# Patient Record
Sex: MALE | Race: WHITE | NOT HISPANIC OR LATINO | Employment: FULL TIME | URBAN - METROPOLITAN AREA
[De-identification: names, ages, dates, MRNs, and addresses within clinical notes are randomized per-mention and may not be internally consistent; named-entity substitution may affect disease eponyms.]

---

## 2017-05-19 ENCOUNTER — OFFICE VISIT (OUTPATIENT)
Dept: MEDICAL GROUP | Facility: PHYSICIAN GROUP | Age: 37
End: 2017-05-19
Payer: COMMERCIAL

## 2017-05-19 VITALS
TEMPERATURE: 97.3 F | OXYGEN SATURATION: 94 % | HEIGHT: 70 IN | SYSTOLIC BLOOD PRESSURE: 118 MMHG | DIASTOLIC BLOOD PRESSURE: 82 MMHG | WEIGHT: 201 LBS | HEART RATE: 73 BPM | BODY MASS INDEX: 28.77 KG/M2

## 2017-05-19 DIAGNOSIS — R20.2 NUMBNESS AND TINGLING OF RIGHT UPPER AND LOWER EXTREMITY: ICD-10-CM

## 2017-05-19 DIAGNOSIS — J40 BRONCHITIS: ICD-10-CM

## 2017-05-19 DIAGNOSIS — R05.9 COUGH: ICD-10-CM

## 2017-05-19 DIAGNOSIS — R20.0 NUMBNESS AND TINGLING OF RIGHT UPPER AND LOWER EXTREMITY: ICD-10-CM

## 2017-05-19 PROCEDURE — 99214 OFFICE O/P EST MOD 30 MIN: CPT | Performed by: FAMILY MEDICINE

## 2017-05-19 RX ORDER — AZITHROMYCIN 250 MG/1
TABLET, FILM COATED ORAL
Qty: 6 TAB | Refills: 0 | Status: SHIPPED | OUTPATIENT
Start: 2017-05-19 | End: 2019-09-26

## 2017-05-19 NOTE — ASSESSMENT & PLAN NOTE
New problem: present for about 2 weeks; dry cough, runny nose; sinus congestion; denies any fever/chills but does have headache.    Took PCN from family; otherwise has taken over-the-counter Mucinex which was only mildly beneficial. He denies any fevers or chills.

## 2017-05-19 NOTE — MR AVS SNAPSHOT
"        Dominick Cuadra   2017 4:40 PM   Office Visit   MRN: [id number]    Department:  [facility]   Dept Phone:  [phone]    Description:  Male : 1980   Provider:  Elsie Ybarra D.O.           Reason for Visit     Cough x5days. numbness      Allergies as of 2017     No Known Allergies      You were diagnosed with     Bronchitis   [243780]       Cough   [786.2.ICD-9-CM]       Numbness and tingling of right upper and lower extremity   [0599990]         Vital Signs     Blood Pressure Pulse Temperature Height Weight Body Mass Index    118/82 mmHg 73 36.3 °C (97.3 °F) 1.778 m (5' 10\") 91.173 kg (201 lb) 28.84 kg/m2    Oxygen Saturation Smoking Status                94% Current Every Day Smoker          Basic Information     Date Of Birth Sex Race Ethnicity Preferred Language    1980 Male White Non- English      Problem List              ICD-10-CM Priority Class Noted - Resolved    Alcohol dependence in remission (CMS-HCC) F10.21   11/10/2016 - Present    Anxiety F41.9   11/10/2016 - Present    Cough R05   2017 - Present    Numbness and tingling of right upper and lower extremity R20.0, R20.2   2017 - Present      Health Maintenance        Date Due Completion Dates    IMM DTaP/Tdap/Td Vaccine (2 - Td) 11/10/2026 11/10/2016            Current Immunizations     Tdap Vaccine 11/10/2016      Below and/or attached are the medications your provider expects you to take. Review all of your home medications and newly ordered medications with your provider and/or pharmacist. Follow medication instructions as directed by your provider and/or pharmacist. Please keep your medication list with you and share with your provider. Update the information when medications are discontinued, doses are changed, or new medications (including over-the-counter products) are added; and carry medication information at all times in the event of emergency situations     Allergies:  No Known Allergies   "          Medications  Valid as of: May 19, 2017 -  5:02 PM    Generic Name Brand Name Tablet Size Instructions for use    Azithromycin (Tab) ZITHROMAX 250 MG Take 2 pills on day 1; then one pill day for day 2-5        Escitalopram Oxalate (Tab) LEXAPRO 20 MG TAKE 1 TAB BY MOUTH EVERY DAY.        TraZODone HCl (Tab) DESYREL 100 MG Take 1 Tab by mouth every bedtime.        .                 Medicines prescribed today were sent to:     [facility] #[id number] - [facility] [place] - [address]    [address] [address]    Phone: [phone] Fax: [phone]    Open 24 Hours?: No      Medication refill instructions:       If your prescription bottle indicates you have medication refills left, it is not necessary to call your provider’s office. Please contact your pharmacy and they will refill your medication.    If your prescription bottle indicates you do not have any refills left, you may request refills at any time through one of the following ways: The online [employer] system (except Urgent Care), by calling your provider’s office, or by asking your pharmacy to contact your provider’s office with a refill request. Medication refills are processed only during regular business hours and may not be available until the next business day. Your provider may request additional information or to have a follow-up visit with you prior to refilling your medication.   *Please Note: Medication refills are assigned a new Rx number when refilled electronically. Your pharmacy may indicate that no refills were authorized even though a new prescription for the same medication is available at the pharmacy. Please request the medicine by name with the pharmacy before contacting your provider for a refill.        Your To Do List     Future Labs/Procedures Complete By Expires    CT-HEAD WITH & W/O  As directed 5/19/2018         [employer] Status: Patient Declined        Quit Tobacco Information     Do you want to quit  using tobacco?    Quitting tobacco decreases risks of cancer, heart and lung disease, increases life expectancy, improves sense of taste and smell, and increases spending money, among other benefits.    If you are thinking about quitting, we can help.  • [facility] Quit Tobacco Program: 110.304.4059  o Program occurs weekly for four weeks and includes pharmacist consultation on products to support quitting smoking or chewing tobacco. A provider referral is needed for pharmacist consultation.  • Tobacco Users Help Hotline: 7-891-QUIT-NOW (140-0239) or https://[place].quitlogix.org/  o Free, confidential telephone and online coaching for [place] residents. Sessions are designed on a schedule that is convenient for you. Eligible clients receive free nicotine replacement therapy.  • Nationally: www.smokefree.gov  o Information and professional assistance to support both immediate and long-term needs as you become, and remain, a non-smoker. Smokefree.gov allows you to choose the help that best fits your needs.

## 2017-05-20 NOTE — ASSESSMENT & PLAN NOTE
New problem. Patient is reporting that he is having numbness and tingling in both his right upper and lower extremity. Patient states this started approximately 3 days ago. He denies any weakness associated with this; he denies any other symptoms such as swelling, change in temperature sensation, change in vibratory sensation. He states that the numbness and tingling have been present consistently during this time; he also denies any other symptoms such as headache, dizziness, chest pain, changes in vision. Upon questioning, patient does confirm that the numbness is in his hand, upper arm, shoulder, foot all on the right side.    Given the severity of this type of symptoms by recommendation is for patient to go to the ER but he refused.

## 2017-05-20 NOTE — PROGRESS NOTES
"Subjective:   Dominick Cuadra is a 36 y.o. male here today for dry cough and right side numbness    Cough  New problem: present for about 2 weeks; dry cough, runny nose; sinus congestion; denies any fever/chills but does have headache.    Took PCN from family; otherwise has taken over-the-counter Mucinex which was only mildly beneficial. He denies any fevers or chills.    Numbness and tingling of right upper and lower extremity  New problem. Patient is reporting that he is having numbness and tingling in both his right upper and lower extremity. Patient states this started approximately 3 days ago. He denies any weakness associated with this; he denies any other symptoms such as swelling, change in temperature sensation, change in vibratory sensation. He states that the numbness and tingling have been present consistently during this time; he also denies any other symptoms such as headache, dizziness, chest pain, changes in vision. Upon questioning, patient does confirm that the numbness is in his hand, upper arm, shoulder, foot all on the right side.    Given the severity of this type of symptoms by recommendation is for patient to go to the ER but he refused.         Current medicines (including changes today)  Current Outpatient Prescriptions   Medication Sig Dispense Refill   • azithromycin (ZITHROMAX) 250 MG Tab Take 2 pills on day 1; then one pill day for day 2-5 6 Tab 0   • escitalopram (LEXAPRO) 20 MG tablet TAKE 1 TAB BY MOUTH EVERY DAY. 90 Tab 0   • trazodone (DESYREL) 100 MG Tab Take 1 Tab by mouth every bedtime. 30 Tab 3     No current facility-administered medications for this visit.     He  has a past medical history of Alcohol abuse.    ROS   No chest pain, no shortness of breath, no abdominal pain  +dry cough; right side numbness     Objective:     Blood pressure 118/82, pulse 73, temperature 36.3 °C (97.3 °F), height 1.778 m (5' 10\"), weight 91.173 kg (201 lb), SpO2 94 %. Body mass index is 28.84 " kg/(m^2).   Physical Exam:  Alert, oriented in no acute distress.  Eye contact is good, speech goal directed, affect calm  HEENT: conjunctiva non-injected, sclera non-icteric.  Pinna normal. TM pearly gray.   Oral mucous membranes pink and moist with no lesions.  Neck No adenopathy or masses in the neck or supraclavicular regions.  Lungs: mild expiratory wheeze noted to all lung fields  CV: regular rate and rhythm.  Abdomen: soft, nontender, No CVAT  Ext: no edema, color normal, vascularity normal, temperature normal  Neuro: CN 2-12 grossly intact; no noted decrease in sensation of right side  MSK: no decrease in mobility of right side; normal gait      Assessment and Plan:   The following treatment plan was discussed     1. Bronchitis      Uncontrolled. Prescription for azithromycin provided; monitor   2. Cough      Uncontrolled; symptoms secondary to problem #1.   3. Numbness and tingling of right upper and lower extremity  CT-HEAD WITH & W/O    Unknown origin. Despite my urging, patient refused to go to the ER. Order head CT for further evaluation. Monitor. Pt kept asking if anything could be given for the symptoms or if any thing else could be done today.  Again, I have stated directly to the pt that these are concerning symptoms since it covers the entire right side of the body and he should go to the ER for evaluation.  Again he refused.        Followup: Return if symptoms worsen or fail to improve.

## 2017-05-22 ENCOUNTER — HOSPITAL ENCOUNTER (OUTPATIENT)
Dept: RADIOLOGY | Facility: MEDICAL CENTER | Age: 37
End: 2017-05-22
Attending: FAMILY MEDICINE
Payer: COMMERCIAL

## 2017-05-22 DIAGNOSIS — R20.0 NUMBNESS AND TINGLING OF RIGHT UPPER AND LOWER EXTREMITY: ICD-10-CM

## 2017-05-22 DIAGNOSIS — R20.2 NUMBNESS AND TINGLING OF RIGHT UPPER AND LOWER EXTREMITY: ICD-10-CM

## 2017-05-22 PROCEDURE — 70450 CT HEAD/BRAIN W/O DYE: CPT

## 2017-05-23 ENCOUNTER — TELEPHONE (OUTPATIENT)
Dept: MEDICAL GROUP | Facility: PHYSICIAN GROUP | Age: 37
End: 2017-05-23

## 2017-05-23 DIAGNOSIS — R20.0 NUMBNESS AND TINGLING: ICD-10-CM

## 2017-05-23 DIAGNOSIS — R20.2 NUMBNESS AND TINGLING: ICD-10-CM

## 2017-05-23 NOTE — TELEPHONE ENCOUNTER
----- Message from Elsie Ybarra D.O. sent at 5/23/2017  8:34 AM PDT -----  Please advise pt that the CT of the head did not show any reason for the numbness in the hand and foot on the right side.  If this continues, please let me know.  I will need to refer you to a specialist for further evaluation.  The CT did show sinus congestion which could indicate allergies.  Recommend over the counter allegra 180 mg daily.    Elsie Ybarra D.O.

## 2017-06-15 RX ORDER — ESCITALOPRAM OXALATE 20 MG/1
TABLET ORAL
Qty: 90 TAB | Refills: 0 | Status: SHIPPED | OUTPATIENT
Start: 2017-06-15 | End: 2017-09-22 | Stop reason: SDUPTHER

## 2017-07-24 RX ORDER — TRAZODONE HYDROCHLORIDE 100 MG/1
100 TABLET ORAL
Qty: 90 TAB | Refills: 0 | Status: SHIPPED | OUTPATIENT
Start: 2017-07-24 | End: 2019-09-26

## 2017-09-22 RX ORDER — ESCITALOPRAM OXALATE 20 MG/1
TABLET ORAL
Qty: 90 TAB | Refills: 1 | Status: SHIPPED | OUTPATIENT
Start: 2017-09-22 | End: 2018-04-01 | Stop reason: SDUPTHER

## 2017-10-26 ENCOUNTER — NON-PROVIDER VISIT (OUTPATIENT)
Dept: URGENT CARE | Facility: CLINIC | Age: 37
End: 2017-10-26

## 2017-10-26 DIAGNOSIS — Z02.1 PRE-EMPLOYMENT DRUG SCREENING: ICD-10-CM

## 2017-10-26 LAB
AMP AMPHETAMINE: NORMAL
COC COCAINE: NORMAL
INT CON NEG: NORMAL
INT CON POS: NORMAL
MET METHAMPHETAMINES: NORMAL
OPI OPIATES: NORMAL
PCP PHENCYCLIDINE: NORMAL
POC DRUG COMMENT 753798-OCCUPATIONAL HEALTH: NEGATIVE
THC: NORMAL

## 2017-10-26 PROCEDURE — 80305 DRUG TEST PRSMV DIR OPT OBS: CPT | Performed by: PHYSICIAN ASSISTANT

## 2019-01-14 RX ORDER — ESCITALOPRAM OXALATE 20 MG/1
TABLET ORAL
Qty: 90 TAB | Refills: 0 | Status: SHIPPED | OUTPATIENT
Start: 2019-01-14 | End: 2019-04-22 | Stop reason: SDUPTHER

## 2019-01-14 NOTE — TELEPHONE ENCOUNTER
Requested Prescriptions     Pending Prescriptions Disp Refills   • escitalopram (LEXAPRO) 20 MG tablet [Pharmacy Med Name: ESCITALOPRAM 20 MG TABLET] 90 Tab 0     Sig: TAKE 1 TABLET BY MOUTH EVERY DAY       VELVET Mitchell.

## 2019-01-14 NOTE — TELEPHONE ENCOUNTER
Was the patient seen in the last year in this department? No  LOV 5/19/17    Does patient have an active prescription for medications requested? Yes    Received Request Via: Pharmacy

## 2019-04-22 RX ORDER — ESCITALOPRAM OXALATE 20 MG/1
20 TABLET ORAL
Qty: 90 TAB | Refills: 0 | Status: SHIPPED | OUTPATIENT
Start: 2019-04-22 | End: 2019-05-28 | Stop reason: SDUPTHER

## 2019-04-22 NOTE — TELEPHONE ENCOUNTER
Was the patient seen in the last year in this department? Yes    Does patient have an active prescription for medications requested? No     Received Request Via: Patient       Patient is establishing with Ness Duncan on 6/4/19.

## 2019-04-22 NOTE — TELEPHONE ENCOUNTER
Requested Prescriptions     Pending Prescriptions Disp Refills   • escitalopram (LEXAPRO) 20 MG tablet 90 Tab 0     Sig: Take 1 Tab by mouth every day. Must keep establishing appointment on 6/4/2019 for further refills       VELVET Mitchell.

## 2019-05-28 RX ORDER — ESCITALOPRAM OXALATE 20 MG/1
20 TABLET ORAL
Qty: 90 TAB | Refills: 0 | Status: SHIPPED | OUTPATIENT
Start: 2019-05-28 | End: 2019-06-19 | Stop reason: SDUPTHER

## 2019-05-28 NOTE — TELEPHONE ENCOUNTER
Was the patient seen in the last year in this department? Yes pt is coming to est 6/6/19    Does patient have an active prescription for medications requested? No     Received Request Via: Patient

## 2019-06-19 RX ORDER — ESCITALOPRAM OXALATE 20 MG/1
20 TABLET ORAL DAILY
Qty: 90 TAB | Refills: 0 | Status: SHIPPED | OUTPATIENT
Start: 2019-06-19 | End: 2019-06-28 | Stop reason: SDUPTHER

## 2019-06-19 NOTE — TELEPHONE ENCOUNTER
Requested Prescriptions     Pending Prescriptions Disp Refills   • escitalopram (LEXAPRO) 20 MG tablet [Pharmacy Med Name: ESCITALOPRAM 20 MG TABLET] 90 Tab 0     Sig: Take 1 Tab by mouth every day. LAST REFILL Patient needs to keep upcoming appointment with a new PCP for future refills.06/19/19       VELVET Mitchell.

## 2019-06-19 NOTE — TELEPHONE ENCOUNTER
Was the patient seen in the last year in this department? No  LOV 05/19/17 Pt schedule to establish with Ness on 09/26/19    Does patient have an active prescription for medications requested? No     Received Request Via: Pharmacy

## 2019-06-28 DIAGNOSIS — F41.9 ANXIETY: ICD-10-CM

## 2019-06-28 NOTE — TELEPHONE ENCOUNTER
Was the patient seen in the last year in this department? No  LAST OFFICE VISIT 05/19/17 SCHEDULE TO ESTABLISH WITH JACKI MCCLENDON 09/26/19    Does patient have an active prescription for medications requested? No     Received Request Via: Pharmacy

## 2019-06-29 RX ORDER — ESCITALOPRAM OXALATE 20 MG/1
20 TABLET ORAL DAILY
Qty: 90 TAB | Refills: 0 | Status: SHIPPED | OUTPATIENT
Start: 2019-06-29 | End: 2019-09-26 | Stop reason: SDUPTHER

## 2019-06-29 NOTE — TELEPHONE ENCOUNTER
Requested Prescriptions     Pending Prescriptions Disp Refills   • escitalopram (LEXAPRO) 20 MG tablet [Pharmacy Med Name: ESCITALOPRAM 20 MG TABLET] 90 Tab 0     Sig: Take 1 Tab by mouth every day. Patient needs to keep upcoming appointment with a new PCP for future refills. 06/28/19       VELVET Mitchell.

## 2019-09-26 ENCOUNTER — OFFICE VISIT (OUTPATIENT)
Dept: MEDICAL GROUP | Facility: PHYSICIAN GROUP | Age: 39
End: 2019-09-26
Payer: COMMERCIAL

## 2019-09-26 VITALS
HEART RATE: 80 BPM | TEMPERATURE: 98.8 F | HEIGHT: 70 IN | BODY MASS INDEX: 28.77 KG/M2 | SYSTOLIC BLOOD PRESSURE: 100 MMHG | DIASTOLIC BLOOD PRESSURE: 70 MMHG | OXYGEN SATURATION: 96 % | WEIGHT: 201 LBS

## 2019-09-26 DIAGNOSIS — Z13.220 SCREENING FOR LIPID DISORDERS: ICD-10-CM

## 2019-09-26 DIAGNOSIS — F10.21 ALCOHOL DEPENDENCE IN REMISSION (HCC): ICD-10-CM

## 2019-09-26 DIAGNOSIS — R53.82 CHRONIC FATIGUE: ICD-10-CM

## 2019-09-26 DIAGNOSIS — Z00.00 ROUTINE HEALTH MAINTENANCE: ICD-10-CM

## 2019-09-26 DIAGNOSIS — F41.9 ANXIETY: ICD-10-CM

## 2019-09-26 PROBLEM — R05.9 COUGH: Status: RESOLVED | Noted: 2017-05-19 | Resolved: 2019-09-26

## 2019-09-26 PROBLEM — R20.0 NUMBNESS AND TINGLING OF RIGHT UPPER AND LOWER EXTREMITY: Status: RESOLVED | Noted: 2017-05-19 | Resolved: 2019-09-26

## 2019-09-26 PROBLEM — R20.2 NUMBNESS AND TINGLING OF RIGHT UPPER AND LOWER EXTREMITY: Status: RESOLVED | Noted: 2017-05-19 | Resolved: 2019-09-26

## 2019-09-26 PROCEDURE — 99214 OFFICE O/P EST MOD 30 MIN: CPT | Performed by: NURSE PRACTITIONER

## 2019-09-26 RX ORDER — ESCITALOPRAM OXALATE 20 MG/1
20 TABLET ORAL DAILY
Qty: 90 TAB | Refills: 3 | Status: SHIPPED | OUTPATIENT
Start: 2019-09-26 | End: 2020-09-30

## 2019-09-26 ASSESSMENT — PATIENT HEALTH QUESTIONNAIRE - PHQ9: CLINICAL INTERPRETATION OF PHQ2 SCORE: 0

## 2019-09-27 NOTE — ASSESSMENT & PLAN NOTE
This is a new problem to the examiner.  Patient reports that he has not drink alcohol in almost 3 years, since October 2016.

## 2019-09-27 NOTE — ASSESSMENT & PLAN NOTE
This is a new problem to the examiner.  This is a chronic problem for the patient that began around October 2016 when he quit drinking alcohol.  The patient had reported feelings of overwhelming anxiety that would come and go.  The patient has continued to take Lexapro 20 mg/day for this issue and reports that it works very well.  Patient states that he did not take the medication for a week when he ran out of the medication and states that he felt return of anxiety.  Patient does not want to change his medication at this time.

## 2019-09-27 NOTE — ASSESSMENT & PLAN NOTE
This is a new problem to the examiner.  This is a chronic problem for the patient that has been going on for at least one year.  The patient reports that he does sleep well at night, goes to bed between 9 and 10 PM and wakes between 330 and 4 AM.  Reports that he has been told he snores, but not that he stops breathing while sleeping.

## 2019-09-29 NOTE — PROGRESS NOTES
CC:   Chief Complaint   Patient presents with   • Establish Care   • Fatigue     HISTORY OF THE PRESENT ILLNESS: Patient is a 38 y.o. male. This pleasant patient is here today to establish care and discuss multiple issues as listed below.    Health Maintenance: Completed  Advised flu shot will be available early September, encouraged MA or office visit to receive flu injection for this season.    Tdap due November 2026.    Anxiety  This is a new problem to the examiner.  This is a chronic problem for the patient that began around October 2016 when he quit drinking alcohol.  The patient had reported feelings of overwhelming anxiety that would come and go.  The patient has continued to take Lexapro 20 mg/day for this issue and reports that it works very well.  Patient states that he did not take the medication for a week when he ran out of the medication and states that he felt return of anxiety.  Patient does not want to change his medication at this time.    Alcohol dependence in remission (CMS-HCC)  This is a new problem to the examiner.  Patient reports that he has not drink alcohol in almost 3 years, since October 2016.    Chronic fatigue  This is a new problem to the examiner.  This is a chronic problem for the patient that has been going on for at least one year.  The patient reports that he does sleep well at night, goes to bed between 9 and 10 PM and wakes between 330 and 4 AM.  Reports that he has been told he snores, but not that he stops breathing while sleeping.    Allergies: Patient has no known allergies.  Current Outpatient Medications Ordered in Epic   Medication Sig Dispense Refill   • escitalopram (LEXAPRO) 20 MG tablet Take 1 Tab by mouth every day. 90 Tab 3     No current Epic-ordered facility-administered medications on file.      Past Medical History:   Diagnosis Date   • Alcohol abuse    • Anxiety      History reviewed. No pertinent surgical history.  Social History     Tobacco Use   • Smoking  status: Never Smoker   • Smokeless tobacco: Current User     Types: Chew   Substance Use Topics   • Alcohol use: No     Comment: October 2016   • Drug use: No     Social History     Social History Narrative     [employer]. No kids.     Family History   Problem Relation Age of Onset   • No Known Problems Mother    • Alcohol/Drug Father    • Heart Disease Father    • Hypertension Father    • Heart Attack Father    • No Known Problems Sister    • Cancer Maternal Uncle    • Heart Disease Paternal Grandfather    • Heart Attack Paternal Grandfather    • No Known Problems Maternal Grandmother    • No Known Problems Maternal Grandfather    • Heart Attack Paternal Grandmother    • Heart Disease Paternal Grandmother      ROS:   Constitutional: Positive for fatigue.  Negative for fever, chills, unexpected weight change, night sweats, body aches, sleep issues, and generalized weakness.   HEENT:  Negative for headaches, vision changes, hearing changes, ear pain, tinnitus, ear discharge, rhinorrhea, sinus congestion, sneezing, sore throat, and neck pain.    Respiratory:  Negative for cough, shortness of breath, sputum production, hemoptysis, chest congestion, dyspnea, wheezing, and crackles.    Cardiovascular:  Negative for chest pain, palpitations, MCDONOUGH, paroxsymal nocturnal dyspnea, orthopnea, and bilateral lower extremity edema.   Gastrointestinal:  Negative for heartburn, nausea, vomiting, abdominal pain, hematochezia, melena, diarrhea, constipation, and greasy/foul-smelling stools.   Genitourinary:  Negative for dysuria, nocturia, polyuria, hematuria, pyuria, urinary urgency, urinary frequency, and urinary incontinence.   Musculoskeletal:  Negative for myalgias, back pain, and joint pain.   Skin:  Negative for rash, sores, lumps, itching, cyanotic skin color change.   Neurological:  Negative for dizziness, tingling, tremors, focal sensory deficit, focal weakness and headaches.  "  Psychiatric/Behavioral: Positive for anxiety.  Negative for depression, suicidal/homicidal ideation and memory loss.        Exam: /70 (BP Location: Left arm, Patient Position: Sitting, BP Cuff Size: Large adult)   Pulse 80   Temp 37.1 °C (98.8 °F) (Temporal)   Ht 1.778 m (5' 10\")   Wt 91.2 kg (201 lb)   SpO2 96%  Body mass index is 28.84 kg/m².    General:  Normal appearing. No distress.  HEENT:  Normocephalic. Eyes conjunctiva clear lids without ptosis, pupils equal and reactive to light accommodation, ears normal shape and contour, canals are clear bilaterally, tympanic membranes are benign, nasal mucosa benign, oropharynx is without erythema, edema or exudates.  Neck:  Supple without JVD or bruit.  Pulmonary:  Clear to ausculation.  Normal effort. No rales, ronchi, or wheezing.  Cardiovascular:  Regular rate and rhythm without murmur. Carotid and radial pulses are intact and equal bilaterally.  Abdomen:  Soft, nontender, nondistended. Normal bowel sounds.   Neurologic:  Grossly nonfocal.  Lymph:  No cervical, supraclavicular lymph nodes are palpable.  Skin:  Warm and dry.  No obvious lesions.  Musculoskeletal:  Normal gait. No extremity cyanosis, clubbing, or edema.  Psych: Normal mood and affect. Alert and oriented x3. Judgment and insight is normal.    Assessment/Plan:  1. Chronic fatigue  - VITAMIN B12; Future  - VITAMIN D,25 HYDROXY; Future  - FERRITIN; Future  - IRON/TOTAL IRON BIND; Future  - TESTOSTERONE, FREE AND TOTAL; Future    2. Anxiety  - TSH WITH REFLEX TO FT4; Future  - escitalopram (LEXAPRO) 20 MG tablet; Take 1 Tab by mouth every day.  Dispense: 90 Tab; Refill: 3    3. Alcohol dependence in remission (HCC)  Continue abstinence from alcohol.  Continue follow-up with Alcoholics Anonymous.    4. Screening for lipid disorders  - Lipid Profile; Future    5. Routine health maintenance  - Comp Metabolic Panel; Future  - CBC WITHOUT DIFFERENTIAL; Future    Educated in proper administration " of medication(s) ordered today including safety, possible SE, risks, benefits, rationale and alternatives to therapy.   Supportive care, differential diagnoses, and indications for immediate follow-up discussed with patient.    Pathogenesis of diagnosis discussed including typical length and natural progression.    Instructed to return to clinic or nearest emergency department for any change in condition, further concerns, or worsening of symptoms.  Patient states understanding of the plan of care and discharge instructions.    Return in about 18 days (around 10/14/2019) for Follow up Labs, fatigue.    Please note that this dictation was created using voice recognition software. I have made every reasonable attempt to correct obvious errors, but I expect that there are errors of grammar and possibly content that I did not discover before finalizing the note.

## 2019-09-30 ENCOUNTER — HOSPITAL ENCOUNTER (OUTPATIENT)
Dept: LAB | Facility: MEDICAL CENTER | Age: 39
End: 2019-09-30
Attending: NURSE PRACTITIONER
Payer: COMMERCIAL

## 2019-09-30 DIAGNOSIS — F41.9 ANXIETY: ICD-10-CM

## 2019-09-30 DIAGNOSIS — Z00.00 ROUTINE HEALTH MAINTENANCE: ICD-10-CM

## 2019-09-30 DIAGNOSIS — R53.82 CHRONIC FATIGUE: ICD-10-CM

## 2019-09-30 DIAGNOSIS — Z13.220 SCREENING FOR LIPID DISORDERS: ICD-10-CM

## 2019-09-30 LAB
25(OH)D3 SERPL-MCNC: 18 NG/ML (ref 30–100)
ALBUMIN SERPL BCP-MCNC: 4.4 G/DL (ref 3.2–4.9)
ALBUMIN/GLOB SERPL: 1.9 G/DL
ALP SERPL-CCNC: 94 U/L (ref 30–99)
ALT SERPL-CCNC: 19 U/L (ref 2–50)
ANION GAP SERPL CALC-SCNC: 11 MMOL/L (ref 0–11.9)
AST SERPL-CCNC: 22 U/L (ref 12–45)
BILIRUB SERPL-MCNC: 0.4 MG/DL (ref 0.1–1.5)
BUN SERPL-MCNC: 12 MG/DL (ref 8–22)
CALCIUM SERPL-MCNC: 9.6 MG/DL (ref 8.5–10.5)
CHLORIDE SERPL-SCNC: 110 MMOL/L (ref 96–112)
CHOLEST SERPL-MCNC: 122 MG/DL (ref 100–199)
CO2 SERPL-SCNC: 22 MMOL/L (ref 20–33)
CREAT SERPL-MCNC: 0.87 MG/DL (ref 0.5–1.4)
ERYTHROCYTE [DISTWIDTH] IN BLOOD BY AUTOMATED COUNT: 39.2 FL (ref 35.9–50)
FASTING STATUS PATIENT QL REPORTED: NORMAL
FERRITIN SERPL-MCNC: 147.6 NG/ML (ref 22–322)
GLOBULIN SER CALC-MCNC: 2.3 G/DL (ref 1.9–3.5)
GLUCOSE SERPL-MCNC: 88 MG/DL (ref 65–99)
HCT VFR BLD AUTO: 46.1 % (ref 42–52)
HDLC SERPL-MCNC: 38 MG/DL
HGB BLD-MCNC: 15.6 G/DL (ref 14–18)
IRON SATN MFR SERPL: 25 % (ref 15–55)
IRON SERPL-MCNC: 79 UG/DL (ref 50–180)
LDLC SERPL CALC-MCNC: 72 MG/DL
MCH RBC QN AUTO: 30 PG (ref 27–33)
MCHC RBC AUTO-ENTMCNC: 33.8 G/DL (ref 33.7–35.3)
MCV RBC AUTO: 88.7 FL (ref 81.4–97.8)
PLATELET # BLD AUTO: 310 K/UL (ref 164–446)
PMV BLD AUTO: 9.5 FL (ref 9–12.9)
POTASSIUM SERPL-SCNC: 4.3 MMOL/L (ref 3.6–5.5)
PROT SERPL-MCNC: 6.7 G/DL (ref 6–8.2)
RBC # BLD AUTO: 5.2 M/UL (ref 4.7–6.1)
SODIUM SERPL-SCNC: 143 MMOL/L (ref 135–145)
TIBC SERPL-MCNC: 314 UG/DL (ref 250–450)
TRIGL SERPL-MCNC: 58 MG/DL (ref 0–149)
TSH SERPL DL<=0.005 MIU/L-ACNC: 1.1 UIU/ML (ref 0.38–5.33)
VIT B12 SERPL-MCNC: 804 PG/ML (ref 211–911)
WBC # BLD AUTO: 6.1 K/UL (ref 4.8–10.8)

## 2019-09-30 PROCEDURE — 80061 LIPID PANEL: CPT

## 2019-09-30 PROCEDURE — 82607 VITAMIN B-12: CPT

## 2019-09-30 PROCEDURE — 84270 ASSAY OF SEX HORMONE GLOBUL: CPT

## 2019-09-30 PROCEDURE — 84403 ASSAY OF TOTAL TESTOSTERONE: CPT

## 2019-09-30 PROCEDURE — 85027 COMPLETE CBC AUTOMATED: CPT

## 2019-09-30 PROCEDURE — 83550 IRON BINDING TEST: CPT

## 2019-09-30 PROCEDURE — 80053 COMPREHEN METABOLIC PANEL: CPT

## 2019-09-30 PROCEDURE — 84443 ASSAY THYROID STIM HORMONE: CPT

## 2019-09-30 PROCEDURE — 82306 VITAMIN D 25 HYDROXY: CPT

## 2019-09-30 PROCEDURE — 83540 ASSAY OF IRON: CPT

## 2019-09-30 PROCEDURE — 36415 COLL VENOUS BLD VENIPUNCTURE: CPT

## 2019-09-30 PROCEDURE — 82728 ASSAY OF FERRITIN: CPT

## 2019-10-01 LAB
SHBG SERPL-SCNC: 33 NMOL/L (ref 11–80)
TESTOST FREE MFR SERPL: 1.9 % (ref 1.6–2.9)
TESTOST FREE SERPL-MCNC: 96 PG/ML (ref 47–244)
TESTOST SERPL-MCNC: 507 NG/DL (ref 300–890)

## 2019-10-05 DIAGNOSIS — E55.9 VITAMIN D DEFICIENCY: ICD-10-CM

## 2019-10-05 RX ORDER — ERGOCALCIFEROL 1.25 MG/1
50000 CAPSULE ORAL
Qty: 12 CAP | Refills: 1 | Status: SHIPPED | OUTPATIENT
Start: 2019-10-05 | End: 2020-12-15

## 2019-10-14 ENCOUNTER — OFFICE VISIT (OUTPATIENT)
Dept: MEDICAL GROUP | Facility: PHYSICIAN GROUP | Age: 39
End: 2019-10-14
Payer: COMMERCIAL

## 2019-10-14 VITALS
DIASTOLIC BLOOD PRESSURE: 70 MMHG | HEIGHT: 70 IN | SYSTOLIC BLOOD PRESSURE: 128 MMHG | BODY MASS INDEX: 28.35 KG/M2 | WEIGHT: 198 LBS | OXYGEN SATURATION: 96 % | TEMPERATURE: 98.6 F | HEART RATE: 88 BPM

## 2019-10-14 DIAGNOSIS — G47.09 OTHER INSOMNIA: ICD-10-CM

## 2019-10-14 DIAGNOSIS — R53.82 CHRONIC FATIGUE: ICD-10-CM

## 2019-10-14 DIAGNOSIS — E55.9 VITAMIN D DEFICIENCY: ICD-10-CM

## 2019-10-14 PROCEDURE — 99214 OFFICE O/P EST MOD 30 MIN: CPT | Performed by: NURSE PRACTITIONER

## 2019-10-14 RX ORDER — TRAZODONE HYDROCHLORIDE 100 MG/1
100 TABLET ORAL NIGHTLY PRN
Qty: 90 TAB | Refills: 3 | Status: SHIPPED | OUTPATIENT
Start: 2019-10-14 | End: 2020-11-02 | Stop reason: SDUPTHER

## 2019-10-14 NOTE — ASSESSMENT & PLAN NOTE
This is a new problem to the examiner. The patient was previously prescribed trazodone 100 mg/HS PRN insomnia for this issue.  At the patient's last appointment, patient stated that he did not think he needed the medication any longer because he was sleeping well at night and not having issues falling asleep.  Now that the patient has been not taking this medication for a couple weeks, he realizes that he is still having issues with sleeping and would like to restart the trazodone nightly.

## 2019-10-14 NOTE — ASSESSMENT & PLAN NOTE
This is a new problem to the examiner.  Chronic, uncontrolled. Does not take vitamin d supplement currently.   Due for updated lab work in 6 months.   9/30/2019 06:55   25-Hydroxy   Vitamin D 25 18 (L)

## 2019-10-15 NOTE — ASSESSMENT & PLAN NOTE
This is an ongoing problem for the patient that has persisted for at least one year.  The patient was sleeping well with trazodone and had decided he did not need the medication because he was falling asleep easily between 9 and 10 PM and waking easily between 330 and 4 AM.  The patient was having daytime drowsiness and fatigue.  Since stopping trazodone nightly, the patient is having issues with falling asleep and staying asleep as well as waking too early.  The patient recently completed some lab work for further investigation into this issue.  It was found that the patient had low vitamin D levels.  However ferritin, B12, TSH, and testerone levels were in normal range.  The patient has not been taking a vitamin D supplement previously.   9/30/2019 06:55   25-Hydroxy Vitamin D 25 18 (L)   Ferritin 147.6   Vitamin B12 -True Cobalamin 804   TSH 1.100   Free Testosterone 96   Sex Hormone Bind Globulin 33   Testosterone % Free 1.9   Testosterone,Total 507

## 2019-10-15 NOTE — PROGRESS NOTES
CC:   Chief Complaint   Patient presents with   • Results     Labs   • Insomnia     HISTORY OF THE PRESENT ILLNESS: Patient is a 38 y.o. male. This pleasant patient is here today to review lab results and discuss issues as listed below..    Health Maintenance: Completed  Declines flu vaccine.  Tdap due November 2026.    Vitamin D deficiency  This is a new problem to the examiner.  Chronic, uncontrolled. Does not take vitamin d supplement currently.   Due for updated lab work in 6 months.   9/30/2019 06:55   25-Hydroxy   Vitamin D 25 18 (L)       Other insomnia  This is a new problem to the examiner. The patient was previously prescribed trazodone 100 mg/HS PRN insomnia for this issue.  At the patient's last appointment, patient stated that he did not think he needed the medication any longer because he was sleeping well at night and not having issues falling asleep.  Now that the patient has been not taking this medication for a couple weeks, he realizes that he is still having issues with sleeping and would like to restart the trazodone nightly.    Chronic fatigue  This is an ongoing problem for the patient that has persisted for at least one year.  The patient was sleeping well with trazodone and had decided he did not need the medication because he was falling asleep easily between 9 and 10 PM and waking easily between 330 and 4 AM.  The patient was having daytime drowsiness and fatigue.  Since stopping trazodone nightly, the patient is having issues with falling asleep and staying asleep as well as waking too early.  The patient recently completed some lab work for further investigation into this issue.  It was found that the patient had low vitamin D levels.  However ferritin, B12, TSH, and testerone levels were in normal range.  The patient has not been taking a vitamin D supplement previously.   9/30/2019 06:55   25-Hydroxy Vitamin D 25 18 (L)   Ferritin 147.6   Vitamin B12 -True Cobalamin 804   TSH 1.100    Free Testosterone 96   Sex Hormone Bind Globulin 33   Testosterone % Free 1.9   Testosterone,Total 507     Allergies: Patient has no known allergies.  Current Outpatient Medications Ordered in Epic   Medication Sig Dispense Refill   • traZODone (DESYREL) 100 MG Tab Take 1 Tab by mouth at bedtime as needed for Sleep. 90 Tab 3   • vitamin D, Ergocalciferol, (DRISDOL) 05408 units Cap capsule Take 1 Cap by mouth every 7 days. Take with food. 12 Cap 1   • escitalopram (LEXAPRO) 20 MG tablet Take 1 Tab by mouth every day. 90 Tab 3     No current Epic-ordered facility-administered medications on file.      Past Medical History:   Diagnosis Date   • Alcohol abuse    • Anxiety      History reviewed. No pertinent surgical history.  Social History     Tobacco Use   • Smoking status: Never Smoker   • Smokeless tobacco: Current User     Types: Chew   Substance Use Topics   • Alcohol use: No     Comment: Quit October 2016   • Drug use: No     Social History     Social History Narrative     [employer]. No kids.     Family History   Problem Relation Age of Onset   • No Known Problems Mother    • Alcohol/Drug Father    • Heart Disease Father    • Hypertension Father    • Heart Attack Father    • No Known Problems Sister    • Cancer Maternal Uncle    • Heart Disease Paternal Grandfather    • Heart Attack Paternal Grandfather    • No Known Problems Maternal Grandmother    • No Known Problems Maternal Grandfather    • Heart Attack Paternal Grandmother    • Heart Disease Paternal Grandmother      ROS:   Constitutional: Positive for fatigue and sleep issues.  Negative for fever, chills, unexpected weight change, night sweats, body aches, and generalized weakness.   HEENT:  Negative for headaches, vision changes, hearing changes, ear pain, tinnitus, ear discharge, rhinorrhea, sinus congestion, sneezing, sore throat, and neck pain.    Respiratory:  Negative for cough, shortness of breath, sputum production,  "hemoptysis, chest congestion, dyspnea, wheezing, and crackles.    Cardiovascular:  Negative for chest pain, palpitations, MCDONOUGH, paroxsymal nocturnal dyspnea, orthopnea, and bilateral lower extremity edema.   Gastrointestinal:  Negative for heartburn, nausea, vomiting, abdominal pain, hematochezia, melena, diarrhea, constipation, and greasy/foul-smelling stools.   Genitourinary:  Negative for dysuria, nocturia, polyuria, hematuria, pyuria, urinary urgency, urinary frequency, and urinary incontinence.   Musculoskeletal:  Negative for myalgias, back pain, and joint pain.   Skin:  Negative for rash, sores, lumps, itching, cyanotic skin color change.   Neurological:  Negative for dizziness, tingling, tremors, focal sensory deficit, focal weakness and headaches.   Psychiatric/Behavioral: Positive for anxiety.  Negative for depression, suicidal/homicidal ideation and memory loss.        Exam: /70 (BP Location: Left arm, Patient Position: Sitting, BP Cuff Size: Adult)   Pulse 88   Temp 37 °C (98.6 °F) (Temporal)   Ht 1.778 m (5' 10\")   Wt 89.8 kg (198 lb)   SpO2 96%  Body mass index is 28.41 kg/m².    General: Well nourished, well developed male in NAD  HEENT: Atraumatic, normocephalic, extraocular movements intact, pupils equal and reactive to light  Neck: Supple, FROM  Chest: No deformities, Equal chest expansion  Respiratory: Unlabored, no stridor or audible wheeze  Abdomen: Non-Distended  Extremities: No Clubbing, Cyanosis, or Edema  Skin: Warm/dry, without rashes  Neuro: A/O x 4, CN 2-12 Grossly intact, Motor/sensory grossly intact  Psych: Normal behavior, normal affect    Assessment/Plan:  1. Other insomnia  Patient was previously taking trazodone 100 mg nightly for insomnia.  Patient tried not using this medication for the last 2 weeks and reports that he had slept terrible and does not need to continue the medication.  The patient needs a refill of this medication.  - traZODone (DESYREL) 100 MG Tab; Take 1 " Tab by mouth at bedtime as needed for Sleep.  Dispense: 90 Tab; Refill: 3    2. Vitamin D deficiency  Prescribed patient vitamin D 50,000 units to be taken 1 time per week for 6 months.  Plan to recheck vitamin D level in 6 months.    3. Chronic fatigue  Patient will monitor to see if symptoms of fatigue improved with better sleep and vitamin D supplementation.    Educated in proper administration of medication(s) ordered today including safety, possible SE, risks, benefits, rationale and alternatives to therapy.   Supportive care, differential diagnoses, and indications for immediate follow-up discussed with patient.    Pathogenesis of diagnosis discussed including typical length and natural progression.    Instructed to return to clinic or nearest emergency department for any change in condition, further concerns, or worsening of symptoms.  Patient states understanding of the plan of care and discharge instructions.    Return in about 6 months (around 4/14/2020) for Vitamin D deficiency medication and lab follow-up.    Please note that this dictation was created using voice recognition software. I have made every reasonable attempt to correct obvious errors, but I expect that there are errors of grammar and possibly content that I did not discover before finalizing the note.

## 2020-09-30 DIAGNOSIS — F41.9 ANXIETY: ICD-10-CM

## 2020-09-30 RX ORDER — ESCITALOPRAM OXALATE 20 MG/1
TABLET ORAL
Qty: 90 TAB | Refills: 0 | Status: SHIPPED | OUTPATIENT
Start: 2020-09-30 | End: 2020-12-15

## 2020-09-30 NOTE — TELEPHONE ENCOUNTER
Requested Prescriptions     Pending Prescriptions Disp Refills   • escitalopram (LEXAPRO) 20 MG tablet [Pharmacy Med Name: ESCITALOPRAM 20MG TABLETS] 90 Tab 0     Sig: TAKE 1 TABLET BY MOUTH EVERY DAY       VELVET Mitchell.

## 2020-09-30 NOTE — TELEPHONE ENCOUNTER
Received request via: Pharmacy    Was the patient seen in the last year in this department? Yes 10/14/19    Does the patient have an active prescription (recently filled or refills available) for medication(s) requested? No

## 2020-11-02 DIAGNOSIS — G47.09 OTHER INSOMNIA: ICD-10-CM

## 2020-11-02 RX ORDER — TRAZODONE HYDROCHLORIDE 100 MG/1
100 TABLET ORAL NIGHTLY PRN
Qty: 90 TAB | Refills: 0 | Status: SHIPPED | OUTPATIENT
Start: 2020-11-02 | End: 2020-12-15

## 2020-11-02 NOTE — TELEPHONE ENCOUNTER
Received request via: Pharmacy    Was the patient seen in the last year in this department? No  LOV 10/14/2019    Does the patient have an active prescription (recently filled or refills available) for medication(s) requested? No

## 2020-11-02 NOTE — TELEPHONE ENCOUNTER
Requested Prescriptions     Pending Prescriptions Disp Refills   • traZODone (DESYREL) 100 MG Tab 90 Tab 0     Sig: Take 1 Tab by mouth at bedtime as needed for Sleep.       Ness Duncan A.P.R.N.

## 2020-11-03 NOTE — TELEPHONE ENCOUNTER
Patient scheduled for 12/15/20 at 9am for a annual exam. Dominick states he no longer needs the Trazadone.

## 2020-12-15 ENCOUNTER — TELEMEDICINE (OUTPATIENT)
Dept: MEDICAL GROUP | Facility: PHYSICIAN GROUP | Age: 40
End: 2020-12-15
Payer: COMMERCIAL

## 2020-12-15 VITALS — RESPIRATION RATE: 18 BRPM | WEIGHT: 190 LBS | BODY MASS INDEX: 27.2 KG/M2 | HEIGHT: 70 IN

## 2020-12-15 DIAGNOSIS — F41.9 ANXIETY: ICD-10-CM

## 2020-12-15 PROCEDURE — 99213 OFFICE O/P EST LOW 20 MIN: CPT | Mod: 95,CR | Performed by: NURSE PRACTITIONER

## 2020-12-15 ASSESSMENT — PATIENT HEALTH QUESTIONNAIRE - PHQ9: CLINICAL INTERPRETATION OF PHQ2 SCORE: 0

## 2020-12-15 ASSESSMENT — FIBROSIS 4 INDEX: FIB4 SCORE: 0.68

## 2020-12-15 NOTE — ASSESSMENT & PLAN NOTE
Chronic, currently uncontrolled.  Patient first experienced issues with anxiety around October 2016 when he quit drinking alcohol.  He was experiencing overwhelming anxiety that would come and go.  He was prescribed Lexapro 20 mg/day and initially felt that the medication was working very well to control his anxiety.  Patient reports that he stopped taking his Lexapro 3 weeks ago, without weaning off.  Patient states that he did not feel like the medication was helping.  Reports that when he was younger he was diagnosed with ADD and prescribed Ritalin.  Reports that since discontinuing Lexapro he has had a difficult time with focusing.  He is feeling stressed due to working in the daytime and attending college classes at night.  States that his anxiety and anger have become a problem at all times.  He did recently start couples therapy every 2 weeks, thinks that it will be helpful.  Is interested in trying a different medication. The patient denies any suicidal ideations or thoughts of harming himself or others.   Depression Screen (PHQ-2/PHQ-9) 11/10/2016 9/26/2019 12/15/2020   PHQ-2 Total Score 0 0 0     Interpretation of PHQ-9 Total Score   Score Severity   1-4 No Depression   5-9 Mild Depression   10-14 Moderate Depression   15-19 Moderately Severe Depression   20-27 Severe Depression

## 2020-12-16 NOTE — PROGRESS NOTES
Virtual Visit: Established Patient   This visit was conducted via Zoom using secure and encrypted videoconferencing technology. The patient was in a private location in the state [facility].    The patient's identity was confirmed and verbal consent was obtained for this virtual visit.    Subjective:   CC:   Chief Complaint   Patient presents with   • Medication Management     pt stopped taking meds      Dominick Cuadra is a 40 y.o. male presenting for evaluation and management of:    Anxiety  Chronic, currently uncontrolled.  Patient first experienced issues with anxiety around October 2016 when he quit drinking alcohol.  He was experiencing overwhelming anxiety that would come and go.  He was prescribed Lexapro 20 mg/day and initially felt that the medication was working very well to control his anxiety.  Patient reports that he stopped taking his Lexapro 3 weeks ago, without weaning off.  Patient states that he did not feel like the medication was helping.  Reports that when he was younger he was diagnosed with ADD and prescribed Ritalin.  Reports that since discontinuing Lexapro he has had a difficult time with focusing.  He is feeling stressed due to working in the daytime and attending college classes at night.  States that his anxiety and anger have become a problem at all times.  He did recently start couples therapy every 2 weeks, thinks that it will be helpful.  Is interested in trying a different medication. The patient denies any suicidal ideations or thoughts of harming himself or others.   Depression Screen (PHQ-2/PHQ-9) 11/10/2016 9/26/2019 12/15/2020   PHQ-2 Total Score 0 0 0     Interpretation of PHQ-9 Total Score   Score Severity   1-4 No Depression   5-9 Mild Depression   10-14 Moderate Depression   15-19 Moderately Severe Depression   20-27 Severe Depression      ROS   Constitutional: No fevers, chills, malaise/fatigue.  Eyes: No eye pain.  ENT: No sore throat, congestion.   Resp: No cough, shortness  "of breath.  CV: No chest pain, leg swelling, palpitations.  GI: No nausea/vomiting, abdominal pain, constipation, diarrhea.  : No dysuria, hematuria.  MSK: No weakness.  Skin: No rashes.  Neuro: No dizziness, weakness, headaches.  Psych: + Anxiety and anger.  No suicidal ideations.    All remaining systems reviewed and found to be negative, except as stated above.     No Known Allergies    Current medicines (including changes today)  Current Outpatient Medications   Medication Sig Dispense Refill   • sertraline (ZOLOFT) 50 MG Tab Take 1 Tab by mouth every day. 90 Tab 0     No current facility-administered medications for this visit.      Patient Active Problem List    Diagnosis Date Noted   • Other insomnia 10/14/2019   • Vitamin D deficiency 10/05/2019   • Chronic fatigue 09/26/2019   • Alcohol dependence in remission (HCC) 11/10/2016   • Anxiety 11/10/2016     Family History   Problem Relation Age of Onset   • No Known Problems Mother    • Alcohol/Drug Father    • Heart Disease Father    • Hypertension Father    • Heart Attack Father    • No Known Problems Sister    • Cancer Maternal Uncle    • Heart Disease Paternal Grandfather    • Heart Attack Paternal Grandfather    • No Known Problems Maternal Grandmother    • No Known Problems Maternal Grandfather    • Heart Attack Paternal Grandmother    • Heart Disease Paternal Grandmother      He  has a past medical history of Alcohol abuse and Anxiety.  He  has no past surgical history on file.     Objective:   Resp 18   Ht 1.778 m (5' 10\")   Wt 86.2 kg (190 lb)   BMI 27.26 kg/m²     Physical Exam:  Constitutional: Alert, no distress, well-groomed.  Skin: No rashes in visible areas.  Eye: Round. Conjunctiva clear, lids normal. No icterus.   ENMT: Lips pink without lesions, good dentition, moist mucous membranes. Phonation normal.  Neck: No masses, no thyromegaly. Moves freely without pain.  Respiratory: Unlabored respiratory effort, no cough or audible " wheeze  Psych: Alert and oriented x3, normal affect and mood.     Assessment and Plan:   The following treatment plan was discussed:   1. Anxiety  Chronic, uncontrolled.  Plan to start sertraline 50 mg/day and follow-up in 1 month to review medication and symptoms.  Reeducated patient to wean off of medication and not abruptly discontinue.  Strongly encouraged continued follow-up with couples therapy.  Patient understands to be seen in urgent care, ER, med group with any thoughts of hurting himself or suicidal ideations.  - sertraline (ZOLOFT) 50 MG Tab; Take 1 Tab by mouth every day.  Dispense: 90 Tab; Refill: 0     Follow-up: Return for Virtual Visit, Anxiety, Follow up Medications.       Please note that this dictation was created using voice recognition software. I have worked with consultants from the vendor as well as technical experts from [facility] [employer] to optimize the interface. I have made every reasonable attempt to correct obvious errors, but I expect that there are errors of grammar and possibly content that I did not discover before finalizing the note.

## 2021-01-28 ENCOUNTER — APPOINTMENT (OUTPATIENT)
Dept: MEDICAL GROUP | Facility: PHYSICIAN GROUP | Age: 41
End: 2021-01-28
Payer: COMMERCIAL

## 2021-03-18 DIAGNOSIS — F41.9 ANXIETY: ICD-10-CM

## 2021-03-18 NOTE — TELEPHONE ENCOUNTER
Received request via: Pharmacy    Was the patient seen in the last year in this department? Yes 12/15/20    Does the patient have an active prescription (recently filled or refills available) for medication(s) requested? No

## 2021-03-19 ENCOUNTER — TELEPHONE (OUTPATIENT)
Dept: MEDICAL GROUP | Facility: PHYSICIAN GROUP | Age: 41
End: 2021-03-19

## 2021-03-19 NOTE — TELEPHONE ENCOUNTER
Called and left vm, just rounding back to see if patient has any issues picking up meds at the pharmacy.

## 2021-03-19 NOTE — TELEPHONE ENCOUNTER
Requested Prescriptions     Pending Prescriptions Disp Refills   • sertraline (ZOLOFT) 50 MG Tab [Pharmacy Med Name: SERTRALINE 50MG TABLETS] 90 tablet 3     Sig: TAKE 1 TABLET BY MOUTH EVERY DAY       VELVET Mitchell.

## 2021-05-28 ENCOUNTER — TELEPHONE (OUTPATIENT)
Dept: MEDICAL GROUP | Facility: PHYSICIAN GROUP | Age: 41
End: 2021-05-28

## 2021-05-28 ENCOUNTER — NURSE TRIAGE (OUTPATIENT)
Dept: HEALTH INFORMATION MANAGEMENT | Facility: OTHER | Age: 41
End: 2021-05-28

## 2021-05-28 NOTE — TELEPHONE ENCOUNTER
Regarding: Numbness right side face, hands, legs, 2 wks  ----- Message from Bushra Thompson sent at 5/28/2021 10:40 AM PDT -----  Per girlfriend Cindy, patient having numbness for 2 weeks on the right side of face, hands and legs.

## 2021-05-28 NOTE — TELEPHONE ENCOUNTER
"Explained that protocol suggests ED for his symptoms which patient is refusing. He wants to see his PCP. It was explained that they may also refer him to the ED and he will need approval to come into the office for his symptoms.Message left for MA for PCP to call the patient. Otherwise, advised to seek care at ED.      Reason for Disposition  • [1] Numbness (i.e., loss of sensation) of the face, arm / hand, or leg / foot on one side of the body AND [2] sudden onset AND [3] brief (now gone)    Answer Assessment - Initial Assessment Questions  1. SYMPTOM: \"What is the main symptom you are concerned about?\" (e.g., weakness, numbness)      Right face, right hand, right leg  2. ONSET: \"When did this start?\" (minutes, hours, days; while sleeping)      About 2 weeks ago  3. LAST NORMAL: \"When was the last time you were normal (no symptoms)?\"      Speaking and acting normal  4. PATTERN \"Does this come and go, or has it been constant since it started?\"  \"Is it present now?\"      constant  5. CARDIAC SYMPTOMS: \"Have you had any of the following symptoms: chest pain, difficulty breathing, palpitations?\"      none  6. NEUROLOGIC SYMPTOMS: \"Have you had any of the following symptoms: headache, dizziness, vision loss, double vision, changes in speech, unsteady on your feet?\"      Right eye is blurry vision since last October and has not had this assessed.   7. OTHER SYMPTOMS: \"Do you have any other symptoms?\"      no  8. PREGNANCY: \"Is there any chance you are pregnant?\" \"When was your last menstrual period?\"      n/a    Protocols used: NEUROLOGIC DEFICIT-A-AH    "

## 2021-05-28 NOTE — TELEPHONE ENCOUNTER
1. Caller Name: Dominick Cuadra                        Call Back Number: [phone] (home)       How would the patient prefer to be contacted with a response: Phone call OK to leave a detailed message    Received a VM from Dominick asking if Ness could return his call. Also received a VM from Emily FIGUEREDO stating that pt called the nurse hot line and she advised Dominick to go to the ER with his current symptoms. Please see her note in [facility] chart.     Returned Dominick's call and left a VM that Ness will not be in the office until Tuesday. Advised PT to go to ER or UC for eval. Told him to make a follow up att with Ness at some point next week

## 2021-06-01 ENCOUNTER — HOSPITAL ENCOUNTER (OUTPATIENT)
Dept: RADIOLOGY | Facility: MEDICAL CENTER | Age: 41
End: 2021-06-01
Attending: NURSE PRACTITIONER
Payer: COMMERCIAL

## 2021-06-01 ENCOUNTER — OFFICE VISIT (OUTPATIENT)
Dept: MEDICAL GROUP | Facility: PHYSICIAN GROUP | Age: 41
End: 2021-06-01
Payer: COMMERCIAL

## 2021-06-01 VITALS
HEIGHT: 70 IN | TEMPERATURE: 98.3 F | BODY MASS INDEX: 27.2 KG/M2 | HEART RATE: 92 BPM | SYSTOLIC BLOOD PRESSURE: 116 MMHG | DIASTOLIC BLOOD PRESSURE: 70 MMHG | WEIGHT: 190 LBS | OXYGEN SATURATION: 96 %

## 2021-06-01 DIAGNOSIS — R20.0 NUMBNESS AND TINGLING IN RIGHT HAND: ICD-10-CM

## 2021-06-01 DIAGNOSIS — R20.0 RIGHT LEG NUMBNESS: ICD-10-CM

## 2021-06-01 DIAGNOSIS — R20.2 NUMBNESS AND TINGLING IN RIGHT HAND: ICD-10-CM

## 2021-06-01 PROCEDURE — 73140 X-RAY EXAM OF FINGER(S): CPT | Mod: RT

## 2021-06-01 PROCEDURE — 99214 OFFICE O/P EST MOD 30 MIN: CPT | Performed by: NURSE PRACTITIONER

## 2021-06-01 RX ORDER — AMOXICILLIN 500 MG/1
500 CAPSULE ORAL 3 TIMES DAILY
COMMUNITY
Start: 2021-05-24 | End: 2021-06-11

## 2021-06-01 ASSESSMENT — FIBROSIS 4 INDEX: FIB4 SCORE: 0.68

## 2021-06-01 ASSESSMENT — PATIENT HEALTH QUESTIONNAIRE - PHQ9: CLINICAL INTERPRETATION OF PHQ2 SCORE: 0

## 2021-06-01 NOTE — ASSESSMENT & PLAN NOTE
New problem to examiner.  Patient reports that approximately 3 weeks ago he woke up with some right face numbness, right neck stiffness, right hand tingling in fingers and intermittent right foot sensation of feeling asleep.  The patient was seen by his dentist last week and prescribed antibiotics for an infected tooth, he will follow up next week for treatment.  States that he has not had pain from the tooth, just the right facial numbness.  Reports that the finger tingling affects all 5 fingers in his right hand and is constant, was not occurring prior to 3 weeks ago.  He does note that he had a cramp in his neck and he had some neck stiffness about 3 weeks ago as well, this has improved.  He has also noticed intermittent right foot sensation of being asleep.  Reports that this is worse after sitting for a while.  States that when he first gets up it feels like he drags his foot if he does not think about it, this resolves once walking.

## 2021-06-02 NOTE — PROGRESS NOTES
CC:   Chief Complaint   Patient presents with   • Numbness     right hand and mouth     HISTORY OF THE PRESENT ILLNESS: Patient is a 40 y.o. male. This pleasant patient is here today to discuss right hand finger tingling and intermittent right foot numbness.    Health Maintenance: Completed    Numbness and tingling in right hand  Right leg numbness  New problem to examiner.  Patient reports that approximately 3 weeks ago he woke up with some right face numbness, right neck stiffness, right hand tingling in fingers and intermittent right foot sensation of feeling asleep.  The patient was seen by his dentist last week and prescribed antibiotics for an infected tooth, he will follow up next week for treatment.  States that he has not had pain from the tooth, just the right facial numbness.  Reports that the finger tingling affects all 5 fingers in his right hand and is constant, was not occurring prior to 3 weeks ago.  He does note that he had a cramp in his neck and he had some neck stiffness about 3 weeks ago as well, this has improved.  He has also noticed intermittent right foot sensation of being asleep.  Reports that this is worse after sitting for a while.  States that when he first gets up it feels like he drags his foot if he does not think about it, this resolves once walking.    Allergies: Patient has no known allergies.  Current Outpatient Medications Ordered in Epic   Medication Sig Dispense Refill   • amoxicillin (AMOXIL) 500 MG Cap TAKE 1 CAPSULE BY MOUTH THREE TIMES DAILY UNTIL GONE     • sertraline (ZOLOFT) 50 MG Tab TAKE 1 TABLET BY MOUTH EVERY DAY 90 tablet 3     No current Epic-ordered facility-administered medications on file.     Past Medical History:   Diagnosis Date   • Alcohol abuse    • Anxiety      History reviewed. No pertinent surgical history.  Social History     Tobacco Use   • Smoking status: Never Smoker   • Smokeless tobacco: Current User     Types: Chew   Vaping Use   • Vaping Use:  "Never used   Substance Use Topics   • Alcohol use: No     Comment: Quit October 2016   • Drug use: No     Social History     Social History Narrative     [employer]. No kids.     Family History   Problem Relation Age of Onset   • No Known Problems Mother    • Alcohol/Drug Father    • Heart Disease Father    • Hypertension Father    • Heart Attack Father    • No Known Problems Sister    • Cancer Maternal Uncle    • Heart Disease Paternal Grandfather    • Heart Attack Paternal Grandfather    • No Known Problems Maternal Grandmother    • No Known Problems Maternal Grandfather    • Heart Attack Paternal Grandmother    • Heart Disease Paternal Grandmother      ROS:   Constitutional: No fevers, chills, malaise/fatigue.  Eyes: No eye pain.  ENT: No sore throat, congestion.   Resp: No cough, shortness of breath.  CV: No chest pain, leg swelling, palpitations.  GI: No nausea/vomiting, abdominal pain, constipation, diarrhea.  : No dysuria, hematuria.  MSK: No weakness.  Skin: No rashes.  Neuro: + Constant right hand finger tingling, right cheek numbness, and intermittent right foot numbness.  No dizziness, weakness, headaches.  Psych: No suicidal ideations.    All remaining systems reviewed and found to be negative, except as stated above.        Exam: /70 (BP Location: Left arm, Patient Position: Sitting, BP Cuff Size: Adult)   Pulse 92   Temp 36.8 °C (98.3 °F) (Temporal)   Ht 1.778 m (5' 10\")   Wt 86.2 kg (190 lb)   SpO2 96%  Body mass index is 27.26 kg/m².    General:  Normal appearing. No distress.  HEENT:  Normocephalic. Eyes conjunctiva clear lids without ptosis, pupils equal and reactive to light accommodation, ears normal shape and contour, canals are clear bilaterally, tympanic membranes are benign, nasal mucosa benign, oropharynx is without erythema, edema or exudates. Sinuses (frontal and maxillary) nontender to palpation.  Neck:  Supple without JVD or bruit. Thyroid is not " enlarged.  Pulmonary:  Clear to ausculation.  Normal effort. No rales, ronchi, or wheezing.  Cardiovascular:  Regular rate and rhythm without murmur. Carotid and radial pulses are intact and equal bilaterally.  Abdomen:  Soft, nontender, nondistended. Normal bowel sounds.  Neurologic:  Grossly nonfocal.  Lymph:  No cervical or supraclavicular lymph nodes are palpable.  Skin:  Warm and dry.  No obvious lesions.  Musculoskeletal: Spurling's test negative. Normal gait. No extremity cyanosis, clubbing, or edema.  Psych:  Normal mood and affect. Alert and oriented x3. Judgment and insight is normal.     Assessment/Plan:  1. Numbness and tingling in right hand  2. Right leg numbness  New problem to examiner and patient, symptoms began about 3 weeks ago.  Continue to follow with dentist, complete antibiotics as prescribed.  Right hand digit 3 x-ray obtained, patient is unsure if there is metal in his medical from work, negative for metal.  Plan to have patient complete EMG/NCS of right upper extremity and right lower extremity.  Plan for MRI cervical spine if EMG/NCS unremarkable.  Will notify patient of results to [facility] when received.  Reviewed signs and symptoms of stroke with patient, strongly advised patient to be seen in the emergency room if symptoms occur.  Strongly encourage patient to discontinue nicotine from all sources, patient currently chews tobacco.  - REFERRAL TO NEURODIAGNOSTICS (EEG,EP,EMG/NCS/DBS)  - MR-CERVICAL SPINE-W/O; Future  - DX-FINGER(S) 2+ RIGHT; Future    Educated in proper administration of medication(s) ordered today including safety, possible SE, risks, benefits, rationale and alternatives to therapy.   Supportive care, differential diagnoses, and indications for immediate follow-up discussed with patient.    Pathogenesis of diagnosis discussed including typical length and natural progression.    Instructed to return to clinic or nearest emergency department for any change in condition,  further concerns, or worsening of symptoms.  Patient states understanding of the plan of care and discharge instructions.    Return if symptoms worsen or fail to improve.    Please note that this dictation was created using voice recognition software. I have made every reasonable attempt to correct obvious errors, but I expect that there are errors of grammar and possibly content that I did not discover before finalizing the note.

## 2021-06-07 ENCOUNTER — APPOINTMENT (OUTPATIENT)
Dept: RADIOLOGY | Facility: MEDICAL CENTER | Age: 41
DRG: 060 | End: 2021-06-07
Attending: EMERGENCY MEDICINE
Payer: COMMERCIAL

## 2021-06-07 ENCOUNTER — APPOINTMENT (OUTPATIENT)
Dept: CARDIOLOGY | Facility: MEDICAL CENTER | Age: 41
DRG: 060 | End: 2021-06-07
Attending: INTERNAL MEDICINE
Payer: COMMERCIAL

## 2021-06-07 ENCOUNTER — HOSPITAL ENCOUNTER (INPATIENT)
Facility: MEDICAL CENTER | Age: 41
LOS: 2 days | DRG: 060 | End: 2021-06-11
Attending: EMERGENCY MEDICINE | Admitting: INTERNAL MEDICINE
Payer: COMMERCIAL

## 2021-06-07 DIAGNOSIS — R47.81 SLURRED SPEECH: ICD-10-CM

## 2021-06-07 DIAGNOSIS — R29.810 FACIAL DROOP: ICD-10-CM

## 2021-06-07 DIAGNOSIS — R20.0 RIGHT LEG NUMBNESS: ICD-10-CM

## 2021-06-07 DIAGNOSIS — F41.9 ANXIETY: ICD-10-CM

## 2021-06-07 DIAGNOSIS — R20.0 LOSS OF SENSATION: ICD-10-CM

## 2021-06-07 LAB
ABO + RH BLD: NORMAL
ABO GROUP BLD: NORMAL
ALBUMIN SERPL BCP-MCNC: 4.3 G/DL (ref 3.2–4.9)
ALBUMIN/GLOB SERPL: 1.7 G/DL
ALP SERPL-CCNC: 101 U/L (ref 30–99)
ALT SERPL-CCNC: 20 U/L (ref 2–50)
AMMONIA PLAS-SCNC: 25 UMOL/L (ref 11–45)
AMPHET UR QL SCN: NEGATIVE
ANION GAP SERPL CALC-SCNC: 8 MMOL/L (ref 7–16)
APPEARANCE UR: CLEAR
APTT PPP: 30.8 SEC (ref 24.7–36)
AST SERPL-CCNC: 26 U/L (ref 12–45)
BARBITURATES UR QL SCN: NEGATIVE
BASOPHILS # BLD AUTO: 0.5 % (ref 0–1.8)
BASOPHILS # BLD: 0.04 K/UL (ref 0–0.12)
BENZODIAZ UR QL SCN: NEGATIVE
BILIRUB SERPL-MCNC: 0.9 MG/DL (ref 0.1–1.5)
BILIRUB UR QL STRIP.AUTO: NEGATIVE
BLD GP AB SCN SERPL QL: NORMAL
BUN SERPL-MCNC: 9 MG/DL (ref 8–22)
BZE UR QL SCN: NEGATIVE
CALCIUM SERPL-MCNC: 8.9 MG/DL (ref 8.5–10.5)
CANNABINOIDS UR QL SCN: NEGATIVE
CHLORIDE SERPL-SCNC: 106 MMOL/L (ref 96–112)
CO2 SERPL-SCNC: 24 MMOL/L (ref 20–33)
COLOR UR: YELLOW
CREAT SERPL-MCNC: 0.74 MG/DL (ref 0.5–1.4)
CRP SERPL HS-MCNC: <0.3 MG/DL (ref 0–0.75)
EKG IMPRESSION: NORMAL
EOSINOPHIL # BLD AUTO: 0.18 K/UL (ref 0–0.51)
EOSINOPHIL NFR BLD: 2.4 % (ref 0–6.9)
ERYTHROCYTE [DISTWIDTH] IN BLOOD BY AUTOMATED COUNT: 38.4 FL (ref 35.9–50)
ERYTHROCYTE [SEDIMENTATION RATE] IN BLOOD BY WESTERGREN METHOD: 6 MM/HOUR (ref 0–20)
EST. AVERAGE GLUCOSE BLD GHB EST-MCNC: 105 MG/DL
GLOBULIN SER CALC-MCNC: 2.5 G/DL (ref 1.9–3.5)
GLUCOSE BLD-MCNC: 96 MG/DL (ref 65–99)
GLUCOSE SERPL-MCNC: 91 MG/DL (ref 65–99)
GLUCOSE UR STRIP.AUTO-MCNC: NEGATIVE MG/DL
HBA1C MFR BLD: 5.3 % (ref 4–5.6)
HCT VFR BLD AUTO: 42 % (ref 42–52)
HCYS SERPL-SCNC: 10.78 UMOL/L
HGB BLD-MCNC: 14.3 G/DL (ref 14–18)
HIV 1+2 AB+HIV1 P24 AG SERPL QL IA: NORMAL
IMM GRANULOCYTES # BLD AUTO: 0.02 K/UL (ref 0–0.11)
IMM GRANULOCYTES NFR BLD AUTO: 0.3 % (ref 0–0.9)
INR PPP: 0.96 (ref 0.87–1.13)
KETONES UR STRIP.AUTO-MCNC: ABNORMAL MG/DL
LEUKOCYTE ESTERASE UR QL STRIP.AUTO: NEGATIVE
LYMPHOCYTES # BLD AUTO: 2.65 K/UL (ref 1–4.8)
LYMPHOCYTES NFR BLD: 35.4 % (ref 22–41)
MCH RBC QN AUTO: 29.5 PG (ref 27–33)
MCHC RBC AUTO-ENTMCNC: 34 G/DL (ref 33.7–35.3)
MCV RBC AUTO: 86.8 FL (ref 81.4–97.8)
METHADONE UR QL SCN: NEGATIVE
MICRO URNS: ABNORMAL
MONOCYTES # BLD AUTO: 0.49 K/UL (ref 0–0.85)
MONOCYTES NFR BLD AUTO: 6.5 % (ref 0–13.4)
NEUTROPHILS # BLD AUTO: 4.11 K/UL (ref 1.82–7.42)
NEUTROPHILS NFR BLD: 54.9 % (ref 44–72)
NITRITE UR QL STRIP.AUTO: NEGATIVE
NRBC # BLD AUTO: 0 K/UL
NRBC BLD-RTO: 0 /100 WBC
OPIATES UR QL SCN: NEGATIVE
OXYCODONE UR QL SCN: NEGATIVE
PCP UR QL SCN: NEGATIVE
PH UR STRIP.AUTO: 5.5 [PH] (ref 5–8)
PLATELET # BLD AUTO: 285 K/UL (ref 164–446)
PMV BLD AUTO: 8.9 FL (ref 9–12.9)
POTASSIUM SERPL-SCNC: 4 MMOL/L (ref 3.6–5.5)
PROPOXYPH UR QL SCN: NEGATIVE
PROT SERPL-MCNC: 6.8 G/DL (ref 6–8.2)
PROT UR QL STRIP: NEGATIVE MG/DL
PROTHROMBIN TIME: 13.1 SEC (ref 12–14.6)
RBC # BLD AUTO: 4.84 M/UL (ref 4.7–6.1)
RBC UR QL AUTO: NEGATIVE
RH BLD: NORMAL
SODIUM SERPL-SCNC: 138 MMOL/L (ref 135–145)
SP GR UR STRIP.AUTO: >=1.045
TREPONEMA PALLIDUM IGG+IGM AB [PRESENCE] IN SERUM OR PLASMA BY IMMUNOASSAY: NORMAL
TROPONIN T SERPL-MCNC: <6 NG/L (ref 6–19)
TSH SERPL DL<=0.005 MIU/L-ACNC: 1.13 UIU/ML (ref 0.38–5.33)
UROBILINOGEN UR STRIP.AUTO-MCNC: 2 MG/DL
VIT B12 SERPL-MCNC: 745 PG/ML (ref 211–911)
WBC # BLD AUTO: 7.5 K/UL (ref 4.8–10.8)

## 2021-06-07 PROCEDURE — 82042 OTHER SOURCE ALBUMIN QUAN EA: CPT

## 2021-06-07 PROCEDURE — 86334 IMMUNOFIX E-PHORESIS SERUM: CPT

## 2021-06-07 PROCEDURE — 85610 PROTHROMBIN TIME: CPT

## 2021-06-07 PROCEDURE — 85652 RBC SED RATE AUTOMATED: CPT

## 2021-06-07 PROCEDURE — 87476 LYME DIS DNA AMP PROBE: CPT

## 2021-06-07 PROCEDURE — 86140 C-REACTIVE PROTEIN: CPT

## 2021-06-07 PROCEDURE — 82175 ASSAY OF ARSENIC: CPT

## 2021-06-07 PROCEDURE — 84155 ASSAY OF PROTEIN SERUM: CPT

## 2021-06-07 PROCEDURE — 82140 ASSAY OF AMMONIA: CPT

## 2021-06-07 PROCEDURE — 84165 PROTEIN E-PHORESIS SERUM: CPT

## 2021-06-07 PROCEDURE — 80053 COMPREHEN METABOLIC PANEL: CPT

## 2021-06-07 PROCEDURE — 83036 HEMOGLOBIN GLYCOSYLATED A1C: CPT

## 2021-06-07 PROCEDURE — 84484 ASSAY OF TROPONIN QUANT: CPT

## 2021-06-07 PROCEDURE — 700117 HCHG RX CONTRAST REV CODE 255: Performed by: EMERGENCY MEDICINE

## 2021-06-07 PROCEDURE — 84443 ASSAY THYROID STIM HORMONE: CPT

## 2021-06-07 PROCEDURE — 70450 CT HEAD/BRAIN W/O DYE: CPT

## 2021-06-07 PROCEDURE — 86038 ANTINUCLEAR ANTIBODIES: CPT

## 2021-06-07 PROCEDURE — 82784 ASSAY IGA/IGD/IGG/IGM EACH: CPT

## 2021-06-07 PROCEDURE — G0378 HOSPITAL OBSERVATION PER HR: HCPCS

## 2021-06-07 PROCEDURE — 81003 URINALYSIS AUTO W/O SCOPE: CPT

## 2021-06-07 PROCEDURE — 87529 HSV DNA AMP PROBE: CPT

## 2021-06-07 PROCEDURE — 82784 ASSAY IGA/IGD/IGG/IGM EACH: CPT | Mod: 91

## 2021-06-07 PROCEDURE — 99285 EMERGENCY DEPT VISIT HI MDM: CPT

## 2021-06-07 PROCEDURE — 86780 TREPONEMA PALLIDUM: CPT

## 2021-06-07 PROCEDURE — 86901 BLOOD TYPING SEROLOGIC RH(D): CPT

## 2021-06-07 PROCEDURE — 86850 RBC ANTIBODY SCREEN: CPT

## 2021-06-07 PROCEDURE — 70496 CT ANGIOGRAPHY HEAD: CPT

## 2021-06-07 PROCEDURE — 36415 COLL VENOUS BLD VENIPUNCTURE: CPT

## 2021-06-07 PROCEDURE — 85025 COMPLETE CBC W/AUTO DIFF WBC: CPT

## 2021-06-07 PROCEDURE — U0005 INFEC AGEN DETEC AMPLI PROBE: HCPCS

## 2021-06-07 PROCEDURE — 82607 VITAMIN B-12: CPT

## 2021-06-07 PROCEDURE — U0003 INFECTIOUS AGENT DETECTION BY NUCLEIC ACID (DNA OR RNA); SEVERE ACUTE RESPIRATORY SYNDROME CORONAVIRUS 2 (SARS-COV-2) (CORONAVIRUS DISEASE [COVID-19]), AMPLIFIED PROBE TECHNIQUE, MAKING USE OF HIGH THROUGHPUT TECHNOLOGIES AS DESCRIBED BY CMS-2020-01-R: HCPCS

## 2021-06-07 PROCEDURE — 83090 ASSAY OF HOMOCYSTEINE: CPT

## 2021-06-07 PROCEDURE — A9270 NON-COVERED ITEM OR SERVICE: HCPCS | Performed by: INTERNAL MEDICINE

## 2021-06-07 PROCEDURE — 83655 ASSAY OF LEAD: CPT

## 2021-06-07 PROCEDURE — 83825 ASSAY OF MERCURY: CPT

## 2021-06-07 PROCEDURE — G0475 HIV COMBINATION ASSAY: HCPCS

## 2021-06-07 PROCEDURE — 83916 OLIGOCLONAL BANDS: CPT

## 2021-06-07 PROCEDURE — 82962 GLUCOSE BLOOD TEST: CPT

## 2021-06-07 PROCEDURE — 99220 PR INITIAL OBSERVATION CARE,LEVL III: CPT | Performed by: INTERNAL MEDICINE

## 2021-06-07 PROCEDURE — 71045 X-RAY EXAM CHEST 1 VIEW: CPT

## 2021-06-07 PROCEDURE — 82040 ASSAY OF SERUM ALBUMIN: CPT

## 2021-06-07 PROCEDURE — 93005 ELECTROCARDIOGRAM TRACING: CPT | Performed by: EMERGENCY MEDICINE

## 2021-06-07 PROCEDURE — 84425 ASSAY OF VITAMIN B-1: CPT

## 2021-06-07 PROCEDURE — 700102 HCHG RX REV CODE 250 W/ 637 OVERRIDE(OP): Performed by: INTERNAL MEDICINE

## 2021-06-07 PROCEDURE — 85730 THROMBOPLASTIN TIME PARTIAL: CPT

## 2021-06-07 PROCEDURE — 80307 DRUG TEST PRSMV CHEM ANLYZR: CPT

## 2021-06-07 PROCEDURE — 86900 BLOOD TYPING SEROLOGIC ABO: CPT

## 2021-06-07 PROCEDURE — 70498 CT ANGIOGRAPHY NECK: CPT

## 2021-06-07 PROCEDURE — 700111 HCHG RX REV CODE 636 W/ 250 OVERRIDE (IP): Performed by: INTERNAL MEDICINE

## 2021-06-07 PROCEDURE — 99244 OFF/OP CNSLTJ NEW/EST MOD 40: CPT | Performed by: PSYCHIATRY & NEUROLOGY

## 2021-06-07 PROCEDURE — 82300 ASSAY OF CADMIUM: CPT

## 2021-06-07 PROCEDURE — 96372 THER/PROPH/DIAG INJ SC/IM: CPT

## 2021-06-07 PROCEDURE — 88108 CYTOPATH CONCENTRATE TECH: CPT

## 2021-06-07 RX ORDER — SILDENAFIL 100 MG/1
100 TABLET, FILM COATED ORAL PRN
COMMUNITY
End: 2022-05-12

## 2021-06-07 RX ORDER — PROMETHAZINE HYDROCHLORIDE 25 MG/1
12.5-25 TABLET ORAL EVERY 4 HOURS PRN
Status: DISCONTINUED | OUTPATIENT
Start: 2021-06-07 | End: 2021-06-11 | Stop reason: HOSPADM

## 2021-06-07 RX ORDER — BISACODYL 10 MG
10 SUPPOSITORY, RECTAL RECTAL
Status: DISCONTINUED | OUTPATIENT
Start: 2021-06-07 | End: 2021-06-11 | Stop reason: HOSPADM

## 2021-06-07 RX ORDER — AMOXICILLIN 250 MG
2 CAPSULE ORAL 2 TIMES DAILY
Status: DISCONTINUED | OUTPATIENT
Start: 2021-06-07 | End: 2021-06-11 | Stop reason: HOSPADM

## 2021-06-07 RX ORDER — ATORVASTATIN CALCIUM 80 MG/1
80 TABLET, FILM COATED ORAL EVERY EVENING
Status: DISCONTINUED | OUTPATIENT
Start: 2021-06-07 | End: 2021-06-09

## 2021-06-07 RX ORDER — ASPIRIN 325 MG
325 TABLET ORAL DAILY
Status: DISCONTINUED | OUTPATIENT
Start: 2021-06-07 | End: 2021-06-09

## 2021-06-07 RX ORDER — PROMETHAZINE HYDROCHLORIDE 25 MG/1
12.5-25 SUPPOSITORY RECTAL EVERY 4 HOURS PRN
Status: DISCONTINUED | OUTPATIENT
Start: 2021-06-07 | End: 2021-06-11 | Stop reason: HOSPADM

## 2021-06-07 RX ORDER — ASPIRIN 300 MG/1
300 SUPPOSITORY RECTAL DAILY
Status: DISCONTINUED | OUTPATIENT
Start: 2021-06-07 | End: 2021-06-09

## 2021-06-07 RX ORDER — ONDANSETRON 4 MG/1
4 TABLET, ORALLY DISINTEGRATING ORAL EVERY 4 HOURS PRN
Status: DISCONTINUED | OUTPATIENT
Start: 2021-06-07 | End: 2021-06-11 | Stop reason: HOSPADM

## 2021-06-07 RX ORDER — ASPIRIN 81 MG/1
324 TABLET, CHEWABLE ORAL DAILY
Status: DISCONTINUED | OUTPATIENT
Start: 2021-06-07 | End: 2021-06-09

## 2021-06-07 RX ORDER — POLYETHYLENE GLYCOL 3350 17 G/17G
1 POWDER, FOR SOLUTION ORAL
Status: DISCONTINUED | OUTPATIENT
Start: 2021-06-07 | End: 2021-06-11 | Stop reason: HOSPADM

## 2021-06-07 RX ORDER — PROCHLORPERAZINE EDISYLATE 5 MG/ML
5-10 INJECTION INTRAMUSCULAR; INTRAVENOUS EVERY 4 HOURS PRN
Status: DISCONTINUED | OUTPATIENT
Start: 2021-06-07 | End: 2021-06-11 | Stop reason: HOSPADM

## 2021-06-07 RX ORDER — ONDANSETRON 2 MG/ML
4 INJECTION INTRAMUSCULAR; INTRAVENOUS EVERY 4 HOURS PRN
Status: DISCONTINUED | OUTPATIENT
Start: 2021-06-07 | End: 2021-06-11 | Stop reason: HOSPADM

## 2021-06-07 RX ADMIN — SERTRALINE HYDROCHLORIDE 50 MG: 50 TABLET ORAL at 20:31

## 2021-06-07 RX ADMIN — ATORVASTATIN CALCIUM 80 MG: 80 TABLET, FILM COATED ORAL at 18:15

## 2021-06-07 RX ADMIN — ENOXAPARIN SODIUM 40 MG: 40 INJECTION SUBCUTANEOUS at 18:15

## 2021-06-07 RX ADMIN — ASPIRIN 325 MG ORAL TABLET 325 MG: 325 PILL ORAL at 18:15

## 2021-06-07 RX ADMIN — IOHEXOL 100 ML: 350 INJECTION, SOLUTION INTRAVENOUS at 16:30

## 2021-06-07 ASSESSMENT — ENCOUNTER SYMPTOMS
PALPITATIONS: 0
SHORTNESS OF BREATH: 0
INSOMNIA: 0
EYE PAIN: 0
ORTHOPNEA: 0
NAUSEA: 0
MYALGIAS: 0
TINGLING: 0
NECK PAIN: 0
DIARRHEA: 0
FOCAL WEAKNESS: 0
DIZZINESS: 0
FEVER: 0
WEIGHT LOSS: 0
BACK PAIN: 0
CHILLS: 0
SEIZURES: 0
HEADACHES: 0
ABDOMINAL PAIN: 0
VOMITING: 0
BLURRED VISION: 0
SPUTUM PRODUCTION: 0
WEAKNESS: 1
SPEECH CHANGE: 1
COUGH: 0
HEARTBURN: 0
EYE DISCHARGE: 0
STRIDOR: 0
EYE REDNESS: 0
DEPRESSION: 0
SENSORY CHANGE: 1
LOSS OF CONSCIOUSNESS: 0
NERVOUS/ANXIOUS: 0

## 2021-06-07 ASSESSMENT — LIFESTYLE VARIABLES
HAVE YOU EVER FELT YOU SHOULD CUT DOWN ON YOUR DRINKING: NO
HAVE PEOPLE ANNOYED YOU BY CRITICIZING YOUR DRINKING: NO
ALCOHOL_USE: NO
EVER HAD A DRINK FIRST THING IN THE MORNING TO STEADY YOUR NERVES TO GET RID OF A HANGOVER: NO
ON A TYPICAL DAY WHEN YOU DRINK ALCOHOL HOW MANY DRINKS DO YOU HAVE: 0
TOTAL SCORE: 0
CONSUMPTION TOTAL: NEGATIVE
TOTAL SCORE: 0
TOTAL SCORE: 0
HOW MANY TIMES IN THE PAST YEAR HAVE YOU HAD 5 OR MORE DRINKS IN A DAY: 0
EVER FELT BAD OR GUILTY ABOUT YOUR DRINKING: NO
AVERAGE NUMBER OF DAYS PER WEEK YOU HAVE A DRINK CONTAINING ALCOHOL: 0

## 2021-06-07 ASSESSMENT — PAIN DESCRIPTION - PAIN TYPE: TYPE: ACUTE PAIN

## 2021-06-07 ASSESSMENT — PATIENT HEALTH QUESTIONNAIRE - PHQ9
SUM OF ALL RESPONSES TO PHQ9 QUESTIONS 1 AND 2: 0
2. FEELING DOWN, DEPRESSED, IRRITABLE, OR HOPELESS: NOT AT ALL
1. LITTLE INTEREST OR PLEASURE IN DOING THINGS: NOT AT ALL

## 2021-06-07 ASSESSMENT — FIBROSIS 4 INDEX
FIB4 SCORE: 0.68
FIB4 SCORE: 0.86

## 2021-06-07 NOTE — ED PROVIDER NOTES
"ED Provider Note    Scribed for Karyn Carter M.D. by Bill Wing. 6/7/2021, 2:50 PM.    Primary care provider: AUBRIE Mitchell  Means of arrival: Walk-In  History obtained from: Patient  History limited by: None    CHIEF COMPLAINT  Chief Complaint   Patient presents with   • Possible Stroke       HPI  Dominick Cuadra is a 40 y.o. male who presents to the Emergency Department for evaluation of acute slurred speech onset yesterday morning. His symptoms began as mild right facial numbness three and a half weeks ago that he did not suspect could be related to a stroke and so he did not see his primary care provider. His numbness then progressed into his left arm and neck. He was seen by his PCP and dentist last week as he was concerned his symptoms could be related to a cavity. His dentist said he does not suspect his symptoms have to do with his cavity and was more likely neuro related. His PCP ordered an outpatient CT on 05/22/21 and recommended neurology follow up. Yesterday when he woke up, he was slurring his speech and his significant other at bedside states that is sounded as if \"he was drunk\" and she notes some right sided facial droop. He additionally had two falls today secondary to right leg weakness, patient reports that his right foot has been dragging and this is not normal for him. He denies hitting his head or any loss of consciousness. His significant other brought him into the ED for evaluation as she is concerned these symptoms are the results of a stroke. He has a family history of heart disease, but denies any known history of strokes or neurologic abnormalities. He additionally notes worsening vision of his right eye that onset one year ago. The patient reports associated right upper and lower extremity numbness, right facial numbness, and right facial droop. Negative for fever, chills, dizziness, tingling, tremor, or loss of consciousness. No alleviating or exacerbating " factors identified by the patient. He has a history of alcohol abuse, but is sober. He smokes tobacco products and uses tobacco chew.     REVIEW OF SYSTEMS  Review of Systems   Constitutional: Negative for chills and fever.   Respiratory: Negative for shortness of breath.    Cardiovascular: Negative for chest pain.   Gastrointestinal: Negative for abdominal pain, nausea and vomiting.   Genitourinary: Negative for dysuria.   Neurological: Positive for weakness. Negative for dizziness, tingling and loss of consciousness.        Positive for slurred speech, right upper and lower extremity weakness, right upper and lower extremity numbness, right facial numbness, falls, and right facial droop.    All other systems reviewed and are negative.      PAST MEDICAL HISTORY   has a past medical history of Alcohol abuse and Anxiety.    SURGICAL HISTORY  patient denies any surgical history    SOCIAL HISTORY  Social History     Tobacco Use   • Smoking status: Current Every Day Smoker   • Smokeless tobacco: Current User     Types: Chew   Vaping Use   • Vaping Use: Never used   Substance Use Topics   • Alcohol use: No     Comment: Quit October 2016   • Drug use: No      Social History     Substance and Sexual Activity   Drug Use No       FAMILY HISTORY  Family History   Problem Relation Age of Onset   • No Known Problems Mother    • Alcohol/Drug Father    • Heart Disease Father    • Hypertension Father    • Heart Attack Father    • No Known Problems Sister    • Cancer Maternal Uncle    • Heart Disease Paternal Grandfather    • Heart Attack Paternal Grandfather    • No Known Problems Maternal Grandmother    • No Known Problems Maternal Grandfather    • Heart Attack Paternal Grandmother    • Heart Disease Paternal Grandmother        CURRENT MEDICATIONS  Home Medications     Reviewed by Roberto Thompson R.N. (Registered Nurse) on 06/07/21 at 8149  Med List Status: Complete   Medication Last Dose Status   sertraline (ZOLOFT) 50 MG Tab  "6/6/2021 Active   sildenafil citrate (VIAGRA) 100 MG tablet 5/31/2021 Active                ALLERGIES  No Known Allergies    PHYSICAL EXAM  VITAL SIGNS: /73   Pulse (!) 58   Temp 36.7 °C (98.1 °F) (Temporal)   Resp 15   Ht 1.778 m (5' 10\")   Wt 93.1 kg (205 lb 4 oz)   SpO2 94%   BMI 29.45 kg/m²   Vitals reviewed by myself.  Physical Exam  Nursing note and vitals reviewed.  Constitutional: Well-developed and well-nourished. No distress.   HENT: Head is normocephalic and atraumatic. Oropharynx is clear and moist without exudate or erythema.   Eyes: Pupils are equal, round, and reactive to light. No horizontal or vertical nystagmus. Conjunctiva are normal.   Cardiovascular: Normal rate and regular rhythm. No murmur heard. Normal radial pulses.  Pulmonary/Chest: Breath sounds normal. No wheezes or rales.   Abdominal: Soft and non-tender. No distention.    Musculoskeletal: Extremities exhibit normal range of motion without edema or tenderness. Patient ambulates with a normal narrow-based steady gait.   Neurological: Mild right sided facial droop, decreased sensation to right face, Ataxic right finger to nose, Awake, alert and oriented to person, place, and time.  No pronator drift.  No dysmetria on cerebellar testing. Normal language.  No obvious foot drop noted on my exam  Skin: Skin is warm and dry. No rash.   Psychiatric: Normal mood and affect. Appropriate for clinical situation.    DIAGNOSTIC STUDIES /  LABS  Labs Reviewed   CBC WITH DIFFERENTIAL - Abnormal; Notable for the following components:       Result Value    MPV 8.9 (*)     All other components within normal limits    Narrative:     Indicate which anticoagulants the patient is on:->UNKNOWN   COMP METABOLIC PANEL - Abnormal; Notable for the following components:    Alkaline Phosphatase 101 (*)     All other components within normal limits   URINALYSIS - Abnormal; Notable for the following components:    Specific Gravity >=1.045 (*)     Ketones " Trace (*)     All other components within normal limits   PROTHROMBIN TIME    Narrative:     Indicate which anticoagulants the patient is on:->UNKNOWN   APTT    Narrative:     Indicate which anticoagulants the patient is on:->UNKNOWN   COD (ADULT)   ABO RH CONFIRM   TROPONIN   ESTIMATED GFR   TSH WITH REFLEX TO FT4   VITAMIN B12   VITAMIN B1   T.PALLIDUM AB EIA   HIV AG/AB COMBO ASSAY DIAGNOSTIC   AMMONIA   URINE DRUG SCREEN   HSV (HERPES SIMPLEX VIRUS) BY PCR (BLOOD)   SARS-COV-2, PCR (IN-HOUSE)    Narrative:     Have you been in close contact with a person who is suspected  or known to be positive for COVID-19 within the last 30 days  (e.g. last seen that person < 30 days ago)->No   MIKKI REFLEXIVE PROFILE   HEAVY METALS BLOOD   SED RATE   CRP QUANTITIVE (NON-CARDIAC)   SPEP W/REFLEX TO ALICE, A, G, M   HOMOCYSTEINE   MULTIPLE SCLEROSIS PROFILE   HEMOGLOBIN A1C   LYME PCR,SERUM/PLASMA   POCT GLUCOSE DEVICE RESULTS       All labs reviewed by me.    EKG Interpretation:  Interpreted by myself    12 Lead EKG interpreted by me to show:  EKG at 4:16 PM: Sinus bradycardia, heart rate 59, normal axis, normal intervals, , , QTc 444, no acute ST-T segment changes, no evidence of acute arrhythmia or ischemia  My impression of this EKG: Does not indicate ischemia or arrhythmia at this time.    RADIOLOGY  CT-CTA NECK WITH & W/O-POST PROCESSING   Final Result      No high-grade stenosis, large vessel occlusion, aneurysm or dissection.      CT-CTA HEAD WITH & W/O-POST PROCESS   Final Result      No large vessel occlusion, high-grade stenosis or aneurysm of the [place].      CT-HEAD W/O   Final Result      No CT evidence of acute infarct, hemorrhage or mass.      DX-CHEST-PORTABLE (1 VIEW)   Final Result      No acute cardiopulmonary abnormality.        The radiologist's interpretation of all radiological studies have been reviewed by me.    REASSESSMENT    2:50 PM - Patient examined at bedside at this time. He  presents with slurred speech and right sided weakness, numbness, and facial droop. Ordered for EKG, COD, Troponin, APTT, Prothrombin Time, CBC with Differential, CT CTA Neck, CT CTA Head, CT Head, and DX Chest to evaluate the patient. Discussed plan which includes radiology, labs, and Neurology consult. I will await lab and radiology results before determining whether interventions are necessary. The patient is agreeable and understanding of my plan of care.     4:33 PM - I spoke with Dr. Avitia (Neuology) and discussed the plan of care.     4:42 PM - I spoke with Dr. Wellington (Hospitalist) and he agrees to admit the patient for further evaluation and treatment.     4:50 PM - Upon repeat evaluation, the patient is resting in bed with stable vitals. I updated him on lab and radiology results as well as admission to the hospital by Dr. Wellington. He is understanding of results and agreeable with plan for admission. He was allowed to ask questions and is feeling reassured at this time.     COURSE & MEDICAL DECISION MAKING  Nursing notes, VS, PMSFHx reviewed in chart.    Patient is a 40-year-old male who comes in for evaluation of decreased sensation in the right side of his body with new onset facial droop and slurred speech over the last 2 days.  Patient has had symptoms for a couple weeks with his most acute symptoms starting 36 hours ago, does not fall into window for TPA or interventional radiology.  On exam patient has slight right-sided facial droop and ataxia with right sided finger-to-nose testing, subjective decrease sensation in the right face.  His symptoms have been progressively worsening making demyelinating or underlying neurologic process more likely than acute stroke.  I have ordered acute stroke protocol to assess for abnormalities given his acute symptoms of facial droop and speech disturbance onset 36 hours ago.    Patient's initial vitals are within normal limits.  EKG returns demonstrates no evidence of  acute arrhythmia or ischemia.  Labs returned and are unremarkable.  Initial CTA of the head and neck demonstrate no acute pathology.  I discussed the case with neurology who agrees that patient likely requires further work-up including MRI and further lab testing.  They will formally consult on the patient and recommend hospitalization for ongoing management.  Discussed the case with Dr. Wellington who has accepted patient for hospitalization.  Patient is in guarded condition.    DISPOSITION:  Patient will be hospitalized by Dr. Wellington (Hospitalist) in guarded condition.      FINAL IMPRESSION  1. Right leg numbness    2. Loss of sensation    3. Facial droop    4. Slurred speech          Bill PASTRANA (Scribe), am scribing for, and in the presence of, Karyn Carter M.D..    Electronically signed by: Bill Wing ([place]), 6/7/2021    Karyn PASTRANA M.D. personally performed the services described in this documentation, as scribed by Bill Wing in my presence, and it is both accurate and complete.    C.     The note accurately reflects work and decisions made by me.  Karyn Carter M.D.  6/7/2021  10:07 PM

## 2021-06-07 NOTE — ED TRIAGE NOTES
"Dominick Roosevelt Venecia  40 y.o. male  Chief Complaint   Patient presents with   • Possible Stroke     Patient reports new onset of symptoms of right-sided facial numbness since 5/13/21. Patient then developed right sided numbness to right arm and right leg two weeks later. Patient had refused to go to ED for these symptoms.     Significant other noticed this morning he woke up with severe slurred speech that she described speech as \"garbled\". Significant other reports patient has fallen twice and had been dragging his right foot.     Patient is currently alert and oriented x4, pupils 4mm PERRLA, reports blurriness to right eye, numbness to right face, right arm, and right leg. Weakness and ataxia noted to right upper and right lower extremity. Mild dysarthria noted.       Vitals:    06/07/21 1039   BP: 119/68   Pulse: 97   Resp: 16   SpO2: 98%       Notified charge BEA Wayne. Charge RN aware.     FSBS 96  Patient NPO since last night at 8 PM, patient notified to remain NPO until evaluated by ERP.   "

## 2021-06-07 NOTE — ASSESSMENT & PLAN NOTE
Also with numbness and tingling of right side of face and intermittent right foot weakness.   Unclear etiology   Initial CT head: negative  MRI findings concerning for active MS  Echo wnl  Neuro following  Pending LP with fluid analysis  Initiated high dose IV steroids x 5 days.

## 2021-06-07 NOTE — ED NOTES
Med Rec completed: per Pt at bedside.     Pt reports completing Amoxil 500mg three times daily 5 day course on 6/1/21.    [facility]  P: [phone]    Pt confirmed following allergies:  No Known Allergies     Pt's home medications:   No current facility-administered medications on file prior to encounter.     Medication Sig   • sildenafil citrate (VIAGRA) 100 MG tablet Take 100 mg by mouth as needed for Erectile Dysfunction.   • sertraline (ZOLOFT) 50 MG Tab Take 50 mg by mouth every day.

## 2021-06-07 NOTE — H&P
Hospital Medicine History & Physical Note    Date of Service  6/7/2021    Primary Care Physician  AUBRIE Mitchell    Consultants  neurology    Code Status  Full Code    Chief Complaint  Chief Complaint   Patient presents with   • Possible Stroke       History of Presenting Illness  40 y.o. male with no significant past medical history who presented 6/7/2021 with right-sided tingling numbness sensation for the past 3 weeks.  It started out with right facial tingling numbness sensation and later on spread to right side of the body.  The patient denies any weakness over the extremities but complained about right foot drop.  Since yesterday he started having slurred speech.  Last week they went to see PCP for his ongoing symptoms.  Because of worsening symptom they came to ER where he had CT scan of the head and CT angiogram head and neck done without any acute finding.  Neurology was consulted and recommended further work-up.    Review of Systems  Review of Systems   Constitutional: Negative for chills, fever and weight loss.   HENT: Negative for congestion and nosebleeds.    Eyes: Negative for blurred vision, pain, discharge and redness.   Respiratory: Negative for cough, sputum production, shortness of breath and stridor.    Cardiovascular: Negative for chest pain, palpitations and orthopnea.   Gastrointestinal: Negative for abdominal pain, diarrhea, heartburn, nausea and vomiting.   Genitourinary: Negative for dysuria, frequency and urgency.   Musculoskeletal: Negative for back pain, myalgias and neck pain.   Skin: Negative for itching and rash.   Neurological: Positive for sensory change and speech change. Negative for dizziness, focal weakness, seizures and headaches.   Psychiatric/Behavioral: Negative for depression. The patient is not nervous/anxious and does not have insomnia.        Past Medical History   has a past medical history of Alcohol abuse and Anxiety.    Surgical History  Reviewed and no  pertinent past surgical history    Family History  family history includes Alcohol/Drug in his father; Cancer in his maternal uncle; Heart Attack in his father, paternal grandfather, and paternal grandmother; Heart Disease in his father, paternal grandfather, and paternal grandmother; Hypertension in his father; No Known Problems in his maternal grandfather, maternal grandmother, mother, and sister.     Social History   reports that he has been smoking. His smokeless tobacco use includes chew. He reports that he does not drink alcohol and does not use drugs.    Allergies  No Known Allergies    Medications  Prior to Admission Medications   Prescriptions Last Dose Informant Patient Reported? Taking?   amoxicillin (AMOXIL) 500 MG Cap 6/1/21 at completed  Yes No   Sig: Take 500 mg by mouth 3 times a day. Pt completed 7 day course on 6/1/21   sertraline (ZOLOFT) 50 MG Tab 6/6/2021 at pm Patient No No   Sig: TAKE 1 TABLET BY MOUTH EVERY DAY   Patient taking differently: Take 50 mg by mouth every day.   sildenafil citrate (VIAGRA) 100 MG tablet 5/31/2021 at unknown Patient Yes Yes   Sig: Take 100 mg by mouth as needed for Erectile Dysfunction.      Facility-Administered Medications: None       Physical Exam  Temp:  [36.7 °C (98.1 °F)] 36.7 °C (98.1 °F)  Pulse:  [54-97] 57  Resp:  [14-16] 16  BP: ()/(67-83) 132/83  SpO2:  [94 %-98 %] 97 %    Physical Exam  Vitals reviewed.   Constitutional:       General: He is not in acute distress.     Appearance: Normal appearance.   HENT:      Head: Normocephalic and atraumatic.      Nose: No congestion or rhinorrhea.   Eyes:      Extraocular Movements: Extraocular movements intact.      Pupils: Pupils are equal, round, and reactive to light.   Cardiovascular:      Rate and Rhythm: Normal rate and regular rhythm.      Pulses: Normal pulses.   Pulmonary:      Effort: Pulmonary effort is normal. No respiratory distress.      Breath sounds: Normal breath sounds.   Abdominal:       General: Bowel sounds are normal. There is no distension.      Palpations: Abdomen is soft.      Tenderness: There is no abdominal tenderness.   Musculoskeletal:         General: No swelling or tenderness.      Cervical back: Normal range of motion and neck supple.   Skin:     General: Skin is warm.      Findings: No erythema.   Neurological:      General: No focal deficit present.      Mental Status: He is alert and oriented to person, place, and time.         Laboratory:  Recent Labs     06/07/21  1428   WBC 7.5   RBC 4.84   HEMOGLOBIN 14.3   HEMATOCRIT 42.0   MCV 86.8   MCH 29.5   MCHC 34.0   RDW 38.4   PLATELETCT 285   MPV 8.9*     Recent Labs     06/07/21  1556   SODIUM 138   POTASSIUM 4.0   CHLORIDE 106   CO2 24   GLUCOSE 91   BUN 9   CREATININE 0.74   CALCIUM 8.9     Recent Labs     06/07/21  1556   ALTSGPT 20   ASTSGOT 26   ALKPHOSPHAT 101*   TBILIRUBIN 0.9   GLUCOSE 91     Recent Labs     06/07/21  1428   APTT 30.8   INR 0.96     No results for input(s): NTPROBNP in the last 72 hours.      Recent Labs     06/07/21  1556   TROPONINT <6       Imaging:  CT-CTA NECK WITH & W/O-POST PROCESSING   Final Result      No high-grade stenosis, large vessel occlusion, aneurysm or dissection.      CT-CTA HEAD WITH & W/O-POST PROCESS   Final Result      No large vessel occlusion, high-grade stenosis or aneurysm of the [place].      CT-HEAD W/O   Final Result      No CT evidence of acute infarct, hemorrhage or mass.      DX-CHEST-PORTABLE (1 VIEW)   Final Result      No acute cardiopulmonary abnormality.      MR-BRAIN-WITH & W/O    (Results Pending)   MR-CERVICAL SPINE-WITH & W/O    (Results Pending)   EC-ECHOCARDIOGRAM LTD W/O CONT    (Results Pending)         Assessment/Plan:  I anticipate this patient is appropriate for observation status at this time.    Numbness and tingling in right hand- (present on admission)  Assessment & Plan  Unclear etiology   Initial CT head: negative  MRI brain pending  MRI   Spine  echo  Asa, statin  Neuro on  Additional labs study by neuro

## 2021-06-07 NOTE — ED NOTES
Patient rounded on and vitals checked, no new complaints. Triage nurse aware of the abnormal vitals. Apologized for wait times.

## 2021-06-08 ENCOUNTER — APPOINTMENT (OUTPATIENT)
Dept: CARDIOLOGY | Facility: MEDICAL CENTER | Age: 41
DRG: 060 | End: 2021-06-08
Attending: INTERNAL MEDICINE
Payer: COMMERCIAL

## 2021-06-08 ENCOUNTER — APPOINTMENT (OUTPATIENT)
Dept: RADIOLOGY | Facility: MEDICAL CENTER | Age: 41
DRG: 060 | End: 2021-06-08
Attending: PSYCHIATRY & NEUROLOGY
Payer: COMMERCIAL

## 2021-06-08 ENCOUNTER — TELEPHONE (OUTPATIENT)
Dept: MEDICAL GROUP | Facility: PHYSICIAN GROUP | Age: 41
End: 2021-06-08

## 2021-06-08 LAB
ANION GAP SERPL CALC-SCNC: 6 MMOL/L (ref 7–16)
BUN SERPL-MCNC: 16 MG/DL (ref 8–22)
CALCIUM SERPL-MCNC: 7.2 MG/DL (ref 8.5–10.5)
CHLORIDE SERPL-SCNC: 113 MMOL/L (ref 96–112)
CHOLEST SERPL-MCNC: 95 MG/DL (ref 100–199)
CO2 SERPL-SCNC: 23 MMOL/L (ref 20–33)
CREAT SERPL-MCNC: 0.72 MG/DL (ref 0.5–1.4)
ERYTHROCYTE [DISTWIDTH] IN BLOOD BY AUTOMATED COUNT: 39.1 FL (ref 35.9–50)
GLUCOSE SERPL-MCNC: 91 MG/DL (ref 65–99)
HCT VFR BLD AUTO: 38.2 % (ref 42–52)
HDLC SERPL-MCNC: 26 MG/DL
HGB BLD-MCNC: 12.7 G/DL (ref 14–18)
LDLC SERPL CALC-MCNC: 57 MG/DL
LV EJECT FRACT  99904: 55
LV EJECT FRACT MOD 2C 99903: 54.46
LV EJECT FRACT MOD 4C 99902: 73.14
LV EJECT FRACT MOD BP 99901: 66.45
MCH RBC QN AUTO: 29.8 PG (ref 27–33)
MCHC RBC AUTO-ENTMCNC: 33.2 G/DL (ref 33.7–35.3)
MCV RBC AUTO: 89.7 FL (ref 81.4–97.8)
PLATELET # BLD AUTO: 254 K/UL (ref 164–446)
PMV BLD AUTO: 9 FL (ref 9–12.9)
POTASSIUM SERPL-SCNC: 3.6 MMOL/L (ref 3.6–5.5)
RBC # BLD AUTO: 4.26 M/UL (ref 4.7–6.1)
SARS-COV-2 RNA RESP QL NAA+PROBE: NOTDETECTED
SODIUM SERPL-SCNC: 142 MMOL/L (ref 135–145)
SPECIMEN SOURCE: NORMAL
TRIGL SERPL-MCNC: 61 MG/DL (ref 0–149)
WBC # BLD AUTO: 5.5 K/UL (ref 4.8–10.8)

## 2021-06-08 PROCEDURE — 70553 MRI BRAIN STEM W/O & W/DYE: CPT

## 2021-06-08 PROCEDURE — 80048 BASIC METABOLIC PNL TOTAL CA: CPT

## 2021-06-08 PROCEDURE — 99226 PR SUBSEQUENT OBSERVATION CARE,LEVEL III: CPT | Performed by: NURSE PRACTITIONER

## 2021-06-08 PROCEDURE — 96372 THER/PROPH/DIAG INJ SC/IM: CPT

## 2021-06-08 PROCEDURE — 700111 HCHG RX REV CODE 636 W/ 250 OVERRIDE (IP): Performed by: INTERNAL MEDICINE

## 2021-06-08 PROCEDURE — A9576 INJ PROHANCE MULTIPACK: HCPCS | Performed by: NURSE PRACTITIONER

## 2021-06-08 PROCEDURE — 700117 HCHG RX CONTRAST REV CODE 255: Performed by: NURSE PRACTITIONER

## 2021-06-08 PROCEDURE — G0378 HOSPITAL OBSERVATION PER HR: HCPCS

## 2021-06-08 PROCEDURE — 700102 HCHG RX REV CODE 250 W/ 637 OVERRIDE(OP): Performed by: NURSE PRACTITIONER

## 2021-06-08 PROCEDURE — 72157 MRI CHEST SPINE W/O & W/DYE: CPT

## 2021-06-08 PROCEDURE — 72158 MRI LUMBAR SPINE W/O & W/DYE: CPT

## 2021-06-08 PROCEDURE — 93306 TTE W/DOPPLER COMPLETE: CPT

## 2021-06-08 PROCEDURE — 72156 MRI NECK SPINE W/O & W/DYE: CPT

## 2021-06-08 PROCEDURE — A9270 NON-COVERED ITEM OR SERVICE: HCPCS | Performed by: INTERNAL MEDICINE

## 2021-06-08 PROCEDURE — 93306 TTE W/DOPPLER COMPLETE: CPT | Mod: 26 | Performed by: INTERNAL MEDICINE

## 2021-06-08 PROCEDURE — 80061 LIPID PANEL: CPT

## 2021-06-08 PROCEDURE — 700102 HCHG RX REV CODE 250 W/ 637 OVERRIDE(OP): Performed by: INTERNAL MEDICINE

## 2021-06-08 PROCEDURE — 85027 COMPLETE CBC AUTOMATED: CPT

## 2021-06-08 PROCEDURE — 99213 OFFICE O/P EST LOW 20 MIN: CPT | Performed by: NURSE PRACTITIONER

## 2021-06-08 PROCEDURE — A9270 NON-COVERED ITEM OR SERVICE: HCPCS | Performed by: NURSE PRACTITIONER

## 2021-06-08 RX ORDER — VITAMIN B COMPLEX
1000 TABLET ORAL DAILY
Status: DISCONTINUED | OUTPATIENT
Start: 2021-06-08 | End: 2021-06-11 | Stop reason: HOSPADM

## 2021-06-08 RX ADMIN — ATORVASTATIN CALCIUM 80 MG: 80 TABLET, FILM COATED ORAL at 18:34

## 2021-06-08 RX ADMIN — ENOXAPARIN SODIUM 40 MG: 40 INJECTION SUBCUTANEOUS at 05:56

## 2021-06-08 RX ADMIN — GADOTERIDOL 15 ML: 279.3 INJECTION, SOLUTION INTRAVENOUS at 23:05

## 2021-06-08 RX ADMIN — ASPIRIN 325 MG ORAL TABLET 325 MG: 325 PILL ORAL at 05:51

## 2021-06-08 RX ADMIN — Medication 1000 UNITS: at 16:27

## 2021-06-08 ASSESSMENT — ENCOUNTER SYMPTOMS
FOCAL WEAKNESS: 1
CHILLS: 0
TINGLING: 0
DIARRHEA: 0
VOMITING: 0
SENSORY CHANGE: 1
MYALGIAS: 0
TREMORS: 0
DIZZINESS: 0
SORE THROAT: 0
CONSTIPATION: 0
DOUBLE VISION: 0
FEVER: 0
PALPITATIONS: 0
COUGH: 0
SHORTNESS OF BREATH: 0
NAUSEA: 0
BLURRED VISION: 0
ABDOMINAL PAIN: 0
HEADACHES: 0

## 2021-06-08 ASSESSMENT — PAIN DESCRIPTION - PAIN TYPE
TYPE: ACUTE PAIN
TYPE: ACUTE PAIN

## 2021-06-08 ASSESSMENT — PAIN SCALES - WONG BAKER: WONGBAKER_NUMERICALRESPONSE: DOESN'T HURT AT ALL

## 2021-06-08 NOTE — THERAPY
SLP Contact Note    Per RN, clinical swallow eval and cog evals are not indicated. SLP will complete the orders.

## 2021-06-08 NOTE — PROGRESS NOTES
Assumed care of patient at 0700hrs; bedside report from NOC RN. Updated on POC. Patient currently A & O x 4; on RA; up self; without complaints of acute pain. Assessment completed, Call light within reach. Whiteboard updated. Fall precautions in place. Bed locked and in lowest position. All questions answered. No other needs indicated at this time.

## 2021-06-08 NOTE — CONSULTS
Neurology Initial Consult H&P  Neurohospitalist Service, [facility]    Referring Physician: Stu Wellington M.D.    Reason for referral: Progressive sensory changes to right face and hand x 3 weeks, frequent tripping over right foot x 2 weeks, slurred speech x 2 days    HPI: Dominick Cuadra is a 40 y.o. man with past history of alcohol abuse now sober x 6 years who presented to [facility] today at the prompting of his girlfriend for c/o sensory changes to right face and hand x 3 weeks, frequent tripping over right foot x 2 weeks, and slurred speech x 2 days and for whom neurology has been consulted for evalutation of the above. He reports he has been in his usual state of health up until 3 weeks ago. He first noticed a pins and needles sensation across his right face which has remained constant for 3 weeks, he then developed pins and needles sensation in his right hand which has also been constant and present for ~2.5 weeks. He reports for at least 2 weeks he has noticed his right foot seems clumsy and has caused him to trip over his right foot on several occasions without subsequent injury sustained. He states that for the past 2 days his girlfriend has been telling him he has slurred speech, and he agrees his speech sounds unusual to him. He denies headache. He denies drug use past and present.    Review of systems: In addition to what is detailed in the HPI above, all other systems reviewed and are negative.    Past Medical History:    has a past medical history of Alcohol abuse and Anxiety.    FHx:  He denies FH of MS or stroke in the young.  family history includes Alcohol/Drug in his father; Cancer in his maternal uncle; Heart Attack in his father, paternal grandfather, and paternal grandmother; Heart Disease in his father, paternal grandfather, and paternal grandmother; Hypertension in his father; No Known Problems in his maternal grandfather, maternal grandmother, mother, and  sister.    SHx:   reports that he has been smoking. His smokeless tobacco use includes chew. He reports that he does not drink alcohol and does not use drugs.    Allergies:  No Known Allergies    Medications:    Current Facility-Administered Medications:   •  sertraline (Zoloft) tablet 50 mg, 50 mg, Oral, DAILY, Stu Wellington M.D.  •  senna-docusate (PERICOLACE or SENOKOT S) 8.6-50 MG per tablet 2 tablet, 2 tablet, Oral, BID **AND** polyethylene glycol/lytes (MIRALAX) PACKET 1 Packet, 1 Packet, Oral, QDAY PRN **AND** magnesium hydroxide (MILK OF MAGNESIA) suspension 30 mL, 30 mL, Oral, QDAY PRN **AND** bisacodyl (DULCOLAX) suppository 10 mg, 10 mg, Rectal, QDAY PRN, Stu Wellington M.D.  •  enoxaparin (LOVENOX) inj 40 mg, 40 mg, Subcutaneous, DAILY, Stu Wellington M.D.  •  ondansetron (ZOFRAN) syringe/vial injection 4 mg, 4 mg, Intravenous, Q4HRS PRN, Stu Wellington M.D.  •  ondansetron (ZOFRAN ODT) dispertab 4 mg, 4 mg, Oral, Q4HRS PRN, Stu Wellington M.D.  •  promethazine (PHENERGAN) tablet 12.5-25 mg, 12.5-25 mg, Oral, Q4HRS PRN, Stu Wellington M.D.  •  promethazine (PHENERGAN) suppository 12.5-25 mg, 12.5-25 mg, Rectal, Q4HRS PRN, Stu Wellington M.D.  •  prochlorperazine (COMPAZINE) injection 5-10 mg, 5-10 mg, Intravenous, Q4HRS PRN, Stu Wellington M.D.  •  atorvastatin (LIPITOR) tablet 80 mg, 80 mg, Oral, Q EVENING, Stu Wellington M.D.  •  aspirin (ASA) tablet 325 mg, 325 mg, Oral, DAILY **OR** aspirin (ASA) chewable tab 324 mg, 324 mg, Oral, DAILY **OR** aspirin (ASA) suppository 300 mg, 300 mg, Rectal, DAILY, Stu Wellington M.D.    Current Outpatient Medications:   •  sildenafil citrate (VIAGRA) 100 MG tablet, Take 100 mg by mouth as needed for Erectile Dysfunction., Disp: , Rfl:   •  sertraline (ZOLOFT) 50 MG Tab, TAKE 1 TABLET BY MOUTH EVERY DAY (Patient taking differently: Take 50 mg by mouth every day.), Disp: 90 tablet, Rfl: 3    Physical Examination:     Vitals:    06/07/21 1347 06/07/21 1430 06/07/21 1500 06/07/21 1530   BP: (!)  99/67 112/73 112/73 132/83   Pulse: (!) 54 (!) 58 62 (!) 57   Resp: 14 15 16 16   Temp: 36.7 °C (98.1 °F)      TempSrc: Temporal      SpO2: 97% 94% 96% 97%   Weight:       Height:           General: Patient is awake and in no acute distress  Eyes: examination of optic disks not indicated at this time given acuity of consult  CV: Rhythm regular, sinus bradycardia    NEUROLOGICAL EXAM:     Mental status: Awake, alert and fully oriented, follows commands  Speech and language: very mild slurring. Speech is easy to understand. The patient is able to name and repeat.  Cranial nerve exam: Pupils are equal, round and reactive to light bilaterally. No APD. Visual fields are full. Extraocular muscles are intact. Sensation in the face is intact to light touch. Face is symmetric. Hearing to finger rub equal. Palate elevates symmetrically. Shoulder shrug is full. Tongue is midline.  Motor exam: Strength is 4/5 in the distal RUE, 5/5 proximally. All other extremities 5/5 both distally and proximally. Tone is normal. No abnormal movements were seen on exam.  Sensory exam: No sensory deficits identified   Deep tendon reflexes:  3+ and symmetric. Toes down-going bilaterally.  Coordination: no ataxia with finger to nose b/l  Gait: deferred given patient preference    Objective Data:    Labs:  Lab Results   Component Value Date/Time    PROTHROMBTM 13.1 06/07/2021 02:28 PM    INR 0.96 06/07/2021 02:28 PM      Lab Results   Component Value Date/Time    WBC 7.5 06/07/2021 02:28 PM    RBC 4.84 06/07/2021 02:28 PM    HEMOGLOBIN 14.3 06/07/2021 02:28 PM    HEMATOCRIT 42.0 06/07/2021 02:28 PM    MCV 86.8 06/07/2021 02:28 PM    MCH 29.5 06/07/2021 02:28 PM    MCHC 34.0 06/07/2021 02:28 PM    MPV 8.9 (L) 06/07/2021 02:28 PM    NEUTSPOLYS 54.90 06/07/2021 02:28 PM    LYMPHOCYTES 35.40 06/07/2021 02:28 PM    MONOCYTES 6.50 06/07/2021 02:28 PM    EOSINOPHILS 2.40 06/07/2021 02:28 PM    BASOPHILS 0.50 06/07/2021 02:28 PM      Lab Results    Component Value Date/Time    SODIUM 138 06/07/2021 03:56 PM    POTASSIUM 4.0 06/07/2021 03:56 PM    CHLORIDE 106 06/07/2021 03:56 PM    CO2 24 06/07/2021 03:56 PM    GLUCOSE 91 06/07/2021 03:56 PM    BUN 9 06/07/2021 03:56 PM    CREATININE 0.74 06/07/2021 03:56 PM      Lab Results   Component Value Date/Time    CHOLSTRLTOT 122 09/30/2019 06:55 AM    LDL 72 09/30/2019 06:55 AM    HDL 38 (A) 09/30/2019 06:55 AM    TRIGLYCERIDE 58 09/30/2019 06:55 AM       Lab Results   Component Value Date/Time    ALKPHOSPHAT 101 (H) 06/07/2021 03:56 PM    ASTSGOT 26 06/07/2021 03:56 PM    ALTSGPT 20 06/07/2021 03:56 PM    TBILIRUBIN 0.9 06/07/2021 03:56 PM        Imaging/Testing:    I interpreted and/or reviewed the patient's neuroimaging    CT-CTA NECK WITH & W/O-POST PROCESSING   Final Result      No high-grade stenosis, large vessel occlusion, aneurysm or dissection.      CT-CTA HEAD WITH & W/O-POST PROCESS   Final Result      No large vessel occlusion, high-grade stenosis or aneurysm of the [place].      CT-HEAD W/O   Final Result      No CT evidence of acute infarct, hemorrhage or mass.      DX-CHEST-PORTABLE (1 VIEW)   Final Result      No acute cardiopulmonary abnormality.      MR-BRAIN-WITH & W/O    (Results Pending)   MR-CERVICAL SPINE-WITH & W/O    (Results Pending)   EC-ECHOCARDIOGRAM LTD W/O CONT    (Results Pending)   MR-THORACIC SPINE-WITH & W/O    (Results Pending)   MR-LUMBAR SPINE-WITH & W/O    (Results Pending)       Assessment and Plan:    Dominick Cuadra is a 40 y.o. man without significant past medical history presenting with c/o sensory changes to right face and hand x 3 weeks, frequent tripping over right foot x 2 weeks, and slurred speech x 2 days and for whom neurology has been consulted for evalutation of the above. Differential diagnoses include MS vs. Stroke vs. MS mimic such as ADEM, SLE, lyme's disease, neurosyphilis. MRI of the brain and spine with and without contrast has been ordered.  Depending on the findings, CSF studies could be considered. Serum studies including MS profile, homocysteine, SPEP w/ reflex, CRP, ESR, MIKKI, HIV, syphilis, vitamin deficiency, TSH, and Lyme PCR have been ordered. He will be admitted to the floor in stable condition and neurology will continue to follow.    PLAN:    MRI Brain w/ and w/o CST  MRI C, T and L spine w/ and w/o CST  Serum studies:   MS profile    Homocysteine   SPEP w/ reflex   CRP   ESR   MIKKI   HIV   T. Pallidum AB   vitamin deficiency   TSH   Lyme PCR   Further recommendations to follow neuroimaging    The evaluation of the patient, and recommended management, was discussed with attending neurologist, Dr. Stuart Avitia.    Corinne Canavero, APRN  Acute Care Neurohospitalist Service

## 2021-06-08 NOTE — PROGRESS NOTES
Assessment completed. Pt A&Ox 4. Respirations are even and unlabored on room air. Pt denies pain at this time. Monitors applied, VS stable, call light and belongings within reach. POC updated (MRI). Pt educated on room and call light, pt verbalized understanding. Communication board updated. Needs met.

## 2021-06-08 NOTE — THERAPY
Contact Note/Screen     Patient Name: Dominick Cuadra  Age:  40 y.o., Sex:  male  Medical Record #: [id number]  Today's Date: 6/8/2021 06/08/21 0902   Total Time Spent   Total Time Spent (Mins) 5   Initial Contact Note    Initial Contact Note Order Received and Verified. Physical Therapy Evaluation NOT Completed Because Patient Does Not Require Acute Physical Therapy at this Time.   Interdisciplinary Plan of Care Collaboration   IDT Collaboration with  Nursing   Collaboration Comments 6/8: Per RN, pt up self and mobilizing independently. PT screened pt and he reports he does not need assistance with mobility/transfers/gait/stairs. Recommend potential followup with outpatient clinic if ankle/foot symptoms do not resolve. No eval indicated. PT will complete orders. -TW   Session Information   Date / Session Number  6/8-Screen Only

## 2021-06-08 NOTE — PROGRESS NOTES
Neurology Initial Consult H&P  Neurohospitalist Service, [facility]    Referring Physician: Stu Wellington M.D.    Reason for referral: Progressive sensory changes to right face and hand x 3 weeks, frequent tripping over right foot x 2 weeks, slurred speech x 2 days    HPI: Dominick Cuadra is a 40 y.o. man with past history of alcohol abuse now sober x 6 years who presented to [facility] today at the prompting of his girlfriend for c/o sensory changes to right face and hand x 3 weeks, frequent tripping over right foot x 2 weeks, and slurred speech x 2 days and for whom neurology has been consulted for evalutation of the above. He reports he has been in his usual state of health up until 3 weeks ago. He first noticed a pins and needles sensation across his right face which has remained constant for 3 weeks, he then developed pins and needles sensation in his right hand which has also been constant and present for ~2.5 weeks. He reports for at least 2 weeks he has noticed his right foot seems clumsy and has caused him to trip over his right foot on several occasions without subsequent injury sustained. He states that for the past 2 days his girlfriend has been telling him he has slurred speech, and he agrees his speech sounds unusual to him. He denies headache. He denies drug use past and present.    Interval Note 6/8: No acute events overnight, no new complaints.    Review of systems: In addition to what is detailed in the HPI above, all other systems reviewed and are negative.    Past Medical History:    has a past medical history of Alcohol abuse and Anxiety.    FHx:  He denies FH of MS or stroke in the young.  family history includes Alcohol/Drug in his father; Cancer in his maternal uncle; Heart Attack in his father, paternal grandfather, and paternal grandmother; Heart Disease in his father, paternal grandfather, and paternal grandmother; Hypertension in his father; No Known Problems in his  maternal grandfather, maternal grandmother, mother, and sister.    SHx:   reports that he has been smoking. His smokeless tobacco use includes chew. He reports that he does not drink alcohol and does not use drugs.    Allergies:  No Known Allergies    Medications:    Current Facility-Administered Medications:   •  sertraline (Zoloft) tablet 50 mg, 50 mg, Oral, DAILY, Stu Wellington M.D., 50 mg at 06/07/21 2031  •  senna-docusate (PERICOLACE or SENOKOT S) 8.6-50 MG per tablet 2 tablet, 2 tablet, Oral, BID **AND** polyethylene glycol/lytes (MIRALAX) PACKET 1 Packet, 1 Packet, Oral, QDAY PRN **AND** magnesium hydroxide (MILK OF MAGNESIA) suspension 30 mL, 30 mL, Oral, QDAY PRN **AND** bisacodyl (DULCOLAX) suppository 10 mg, 10 mg, Rectal, QDAY PRN, Stu Wellington M.D.  •  enoxaparin (LOVENOX) inj 40 mg, 40 mg, Subcutaneous, DAILY, Stu Wellington M.D., 40 mg at 06/08/21 0556  •  ondansetron (ZOFRAN) syringe/vial injection 4 mg, 4 mg, Intravenous, Q4HRS PRN, Stu Wellington M.D.  •  ondansetron (ZOFRAN ODT) dispertab 4 mg, 4 mg, Oral, Q4HRS PRN, Stu Wellington M.D.  •  promethazine (PHENERGAN) tablet 12.5-25 mg, 12.5-25 mg, Oral, Q4HRS PRN, Stu Wellington M.D.  •  promethazine (PHENERGAN) suppository 12.5-25 mg, 12.5-25 mg, Rectal, Q4HRS PRN, Stu Wellington M.D.  •  prochlorperazine (COMPAZINE) injection 5-10 mg, 5-10 mg, Intravenous, Q4HRS PRN, Stu Wellington M.D.  •  atorvastatin (LIPITOR) tablet 80 mg, 80 mg, Oral, Q EVENING, Stu Wellington M.D., 80 mg at 06/07/21 1815  •  aspirin (ASA) tablet 325 mg, 325 mg, Oral, DAILY, 325 mg at 06/08/21 0551 **OR** aspirin (ASA) chewable tab 324 mg, 324 mg, Oral, DAILY **OR** aspirin (ASA) suppository 300 mg, 300 mg, Rectal, DAILY, Stu Wellington M.D.    Physical Examination:     Vitals:    06/07/21 2310 06/08/21 0512 06/08/21 0810 06/08/21 1207   BP: 100/57 112/69 108/60 111/58   Pulse: 72 68 60 (!) 56   Resp: 18 16 16 16   Temp: 36.5 °C (97.7 °F) 36.2 °C (97.1 °F) 36.4 °C (97.5 °F) 36.9 °C (98.4 °F)    TempSrc: Temporal Temporal Temporal Temporal   SpO2: 93%  92% 96%   Weight:       Height:           General: Patient is awake and in no acute distress  Eyes: examination of optic disks not indicated at this time given acuity of consult  CV: RRR    NEUROLOGICAL EXAM:     Mental status: Sleeping but rouses briskly to voice. Fully oriented, follows commands  Speech and language: very mild slurring. Speech is easy to understand. The patient is able to name and repeat.  Cranial nerve exam: Pupils are equal, round and reactive to light bilaterally. No APD. Visual fields are full. Extraocular muscles are intact. Sensation in the face is intact to light touch. Face is symmetric. Hearing to finger rub equal. Palate elevates symmetrically. Shoulder shrug is full. Tongue is midline.  Motor exam: Strength is 4/5 in the distal RUE, 5/5 proximally. All other extremities 5/5 both distally and proximally. Tone is normal. No abnormal movements were seen on exam.  Sensory exam: No sensory deficits identified   Deep tendon reflexes:  3+ and symmetric. Toes down-going bilaterally.  Coordination: no ataxia with finger to nose b/l  Gait: deferred given patient preference    Objective Data:    Labs:  Lab Results   Component Value Date/Time    PROTHROMBTM 13.1 06/07/2021 02:28 PM    INR 0.96 06/07/2021 02:28 PM      Lab Results   Component Value Date/Time    WBC 5.5 06/08/2021 06:00 AM    RBC 4.26 (L) 06/08/2021 06:00 AM    HEMOGLOBIN 12.7 (L) 06/08/2021 06:00 AM    HEMATOCRIT 38.2 (L) 06/08/2021 06:00 AM    MCV 89.7 06/08/2021 06:00 AM    MCH 29.8 06/08/2021 06:00 AM    MCHC 33.2 (L) 06/08/2021 06:00 AM    MPV 9.0 06/08/2021 06:00 AM    NEUTSPOLYS 54.90 06/07/2021 02:28 PM    LYMPHOCYTES 35.40 06/07/2021 02:28 PM    MONOCYTES 6.50 06/07/2021 02:28 PM    EOSINOPHILS 2.40 06/07/2021 02:28 PM    BASOPHILS 0.50 06/07/2021 02:28 PM      Lab Results   Component Value Date/Time    SODIUM 142 06/08/2021 06:00 AM    POTASSIUM 3.6 06/08/2021  06:00 AM    CHLORIDE 113 (H) 06/08/2021 06:00 AM    CO2 23 06/08/2021 06:00 AM    GLUCOSE 91 06/08/2021 06:00 AM    BUN 16 06/08/2021 06:00 AM    CREATININE 0.72 06/08/2021 06:00 AM      Lab Results   Component Value Date/Time    CHOLSTRLTOT 95 (L) 06/08/2021 06:00 AM    LDL 57 06/08/2021 06:00 AM    HDL 26 (A) 06/08/2021 06:00 AM    TRIGLYCERIDE 61 06/08/2021 06:00 AM       Lab Results   Component Value Date/Time    ALKPHOSPHAT 101 (H) 06/07/2021 03:56 PM    ASTSGOT 26 06/07/2021 03:56 PM    ALTSGPT 20 06/07/2021 03:56 PM    TBILIRUBIN 0.9 06/07/2021 03:56 PM        Imaging/Testing:    I interpreted and/or reviewed the patient's neuroimaging    EC-ECHOCARDIOGRAM COMPLETE W/O CONT   Final Result      CT-CTA NECK WITH & W/O-POST PROCESSING   Final Result      No high-grade stenosis, large vessel occlusion, aneurysm or dissection.      CT-CTA HEAD WITH & W/O-POST PROCESS   Final Result      No large vessel occlusion, high-grade stenosis or aneurysm of the [place].      CT-HEAD W/O   Final Result      No CT evidence of acute infarct, hemorrhage or mass.      DX-CHEST-PORTABLE (1 VIEW)   Final Result      No acute cardiopulmonary abnormality.      MR-BRAIN-WITH & W/O    (Results Pending)   MR-CERVICAL SPINE-WITH & W/O    (Results Pending)   MR-THORACIC SPINE-WITH & W/O    (Results Pending)   MR-LUMBAR SPINE-WITH & W/O    (Results Pending)       Assessment and Plan:    Dominick Cuadra is a 40 y.o. man without significant past medical history presenting with c/o sensory changes to right face and hand x 3 weeks, frequent tripping over right foot x 2 weeks, and slurred speech x 2 days and for whom neurology has been consulted for evalutation of the above. Differential diagnoses include MS vs. Stroke vs. MS mimic such as ADEM, SLE, lyme's disease, neurosyphilis. MRI of the brain and spine with and without contrast remains pending. Depending on the findings, CSF studies could be considered. Serum studies have  revealed normal homocysteine level, ESR and TSH, HIV and RPR were nonreactive, Vitamin B12 is not deficient. Vitamin D is deficient with value 18, replacement has been ordered. MS profile, SPEP w/ reflex, MIKKI, and Lyme PCR remain pending. If workup is inconclusive, could consider EMG/NCV. He remains admitted to the floor in stable condition and neurology will continue to follow.    PLAN:    -MRI Brain w/ and w/o CST  -MRI C, T and L spine w/ and w/o CST  -Vitamin D 1000u Daily, recommend level be reassessed in 3 months  -Serum studies ordered and pending:   MS profile    SPEP w/ reflex   MIKKI   HIV   TSH   Lyme PCR   -Further recommendations to follow neuroimaging    The evaluation of the patient, and recommended management, was discussed with attending neurologist, Dr. Stuart Avitia.    Corinne Canavero, APRN  Acute Care Neurohospitalist Service

## 2021-06-08 NOTE — DISCHARGE PLANNING
[facility] Transitional Care Coordination    Referral from:  [facility]    Insurance Provider on Facesheet: [employer]    Potential Rehab Diagnosis: CVA?    Chart review indicates patient may have on going medical management and may have therapy needs to possibly meet inpatient rehab facility criteria with the goal of returning to community.    D/C support: TBD     Physiatry consultation pended per protocol.     CVA?  W/U & TX pending.  Waiting on additional information to determine appropriateness for acute inpatient rehabilitation. Will continue to follow.      Thank you for the referral.

## 2021-06-08 NOTE — DISCHARGE PLANNING
Care Transition Team Assessment    Spoke with patient at bedside and verified all information. Lives alone in single story house. Has father as support. PCP Ness Duncan. Uses no DME. Uses [facility] on [address]. Girl friend will be ride @ D/C.    Information Source  Orientation Level: Oriented X4  Information Given By: Patient    Readmission Evaluation  Is this a readmission?: No    Interdisciplinary Discharge Planning  Primary Care Physician: Ness Duncan  Lives with - Patient's Self Care Capacity: Alone and Able to Care For Self  Patient or legal guardian wants to designate a caregiver: No  Support Systems: Parent  Housing / Facility: [address]  Do You Take your Prescribed Medications Regularly: Yes  Able to Return to Previous ADL's: Yes  Mobility Issues: No  Prior Services: Home-Independent  Patient Prefers to be Discharged to:: Home  Assistance Needed: No  Durable Medical Equipment: Not Applicable    Discharge Preparedness  What are your discharge supports?: Parent  Prior Functional Level: Ambulatory    Functional Assesment  Prior Functional Level: Ambulatory    Finances  Prescription Coverage: Yes    Anticipated Discharge Information  Discharge Address: [address] [place] [address]  Discharge Contact Phone Number: [phone]

## 2021-06-08 NOTE — CARE PLAN
Problem: Knowledge Deficit - Stroke Education  Goal: Patient's knowledge of stroke and risk factors will improve  Outcome: Progressing  Note: POC discussed with pt and significant other at bedside.questions answered.      Problem: Neuro Status  Goal: Neuro status will remain stable or improve  Outcome: Progressing  Note: Trendig q4 hr neuro assessment.    The patient is Stable - Low risk of patient condition declining or worsening         Progress made toward(s) clinical / shift goals:  progressing    Patient is not progressing towards the following goals:

## 2021-06-08 NOTE — PROGRESS NOTES
[facility] Medicine Daily Progress Note    Date of Service  6/8/2021    Chief Complaint  40 y.o. male admitted 6/7/2021 with right face and upper extremity numbness and tingling.    Hospital Course  This is a 40-year-old male with a past medical history of alcohol abuse admitted with complaints of right face and right upper extremity numbness and tingling ongoing for approximately 3 weeks.  He also reports intermittent right foot weakness and tripping over his foot.  He also endorsed new slurred speech ongoing for 2 days prior to admission.  Neurology consulted.  MRI brain and spine pending.    Interval Problem Update  6/8: No change in neurological symptoms.  MRI is pending.  Evaluated by therapies with no acute therapy needs.  Vital signs stable.  Afebrile.    Consultants/Specialty  Neurology.    Code Status  Full Code    Disposition  TBD.    Review of Systems  Review of Systems   Constitutional: Negative for chills, fever and malaise/fatigue.   HENT: Negative for congestion and sore throat.    Eyes: Negative for blurred vision and double vision.   Respiratory: Negative for cough and shortness of breath.    Cardiovascular: Negative for chest pain, palpitations and leg swelling.   Gastrointestinal: Negative for abdominal pain, constipation, diarrhea, nausea and vomiting.   Genitourinary: Negative for dysuria.   Musculoskeletal: Negative for joint pain and myalgias.   Skin: Negative for itching and rash.   Neurological: Positive for sensory change (Right face and right upper extremity paresthesias.) and focal weakness (Interrmitent right foot weakness.). Negative for dizziness, tingling, tremors and headaches.        Physical Exam  Temp:  [36.2 °C (97.1 °F)-36.9 °C (98.4 °F)] 36.9 °C (98.4 °F)  Pulse:  [56-72] 56  Resp:  [15-18] 16  BP: (100-132)/(57-85) 111/58  SpO2:  [92 %-97 %] 96 %    Physical Exam  Vitals and nursing note reviewed.   Constitutional:       General: He is not in acute distress.     Appearance:  Normal appearance. He is not ill-appearing.   HENT:      Head: Normocephalic and atraumatic.      Nose: Nose normal. No congestion.      Mouth/Throat:      Mouth: Mucous membranes are moist.      Pharynx: Oropharynx is clear. No oropharyngeal exudate.   Eyes:      General:         Right eye: No discharge.         Left eye: No discharge.      Conjunctiva/sclera: Conjunctivae normal.   Cardiovascular:      Rate and Rhythm: Normal rate and regular rhythm.      Pulses: Normal pulses.      Heart sounds: Normal heart sounds.   Pulmonary:      Effort: Pulmonary effort is normal. No respiratory distress.      Breath sounds: Normal breath sounds. No wheezing or rales.   Abdominal:      General: Bowel sounds are normal. There is no distension.      Palpations: Abdomen is soft.      Tenderness: There is no abdominal tenderness.   Musculoskeletal:         General: Normal range of motion.      Cervical back: Normal range of motion and neck supple. No rigidity. No muscular tenderness.      Right lower leg: No edema.      Left lower leg: No edema.   Skin:     General: Skin is warm and dry.      Coloration: Skin is not jaundiced or pale.   Neurological:      Mental Status: He is alert and oriented to person, place, and time.      Comments: Right-sided paresthesias.  Intermittent right foot weakness.    Psychiatric:         Mood and Affect: Mood normal.         Behavior: Behavior normal.         Thought Content: Thought content normal.         Judgment: Judgment normal.         Fluids  No intake or output data in the 24 hours ending 06/08/21 1432    Laboratory  Recent Labs     06/07/21  1428 06/08/21  0600   WBC 7.5 5.5   RBC 4.84 4.26*   HEMOGLOBIN 14.3 12.7*   HEMATOCRIT 42.0 38.2*   MCV 86.8 89.7   MCH 29.5 29.8   MCHC 34.0 33.2*   RDW 38.4 39.1   PLATELETCT 285 254   MPV 8.9* 9.0     Recent Labs     06/07/21  1556 06/08/21  0600   SODIUM 138 142   POTASSIUM 4.0 3.6   CHLORIDE 106 113*   CO2 24 23   GLUCOSE 91 91   BUN 9 16    CREATININE 0.74 0.72   CALCIUM 8.9 7.2*     Recent Labs     06/07/21  1428   APTT 30.8   INR 0.96         Recent Labs     06/08/21  0600   TRIGLYCERIDE 61   HDL 26*   LDL 57       Imaging  EC-ECHOCARDIOGRAM COMPLETE W/O CONT   Final Result      CT-CTA NECK WITH & W/O-POST PROCESSING   Final Result      No high-grade stenosis, large vessel occlusion, aneurysm or dissection.      CT-CTA HEAD WITH & W/O-POST PROCESS   Final Result      No large vessel occlusion, high-grade stenosis or aneurysm of the [place] of [place].      CT-HEAD W/O   Final Result      No CT evidence of acute infarct, hemorrhage or mass.      DX-CHEST-PORTABLE (1 VIEW)   Final Result      No acute cardiopulmonary abnormality.      MR-BRAIN-WITH & W/O    (Results Pending)   MR-CERVICAL SPINE-WITH & W/O    (Results Pending)   MR-THORACIC SPINE-WITH & W/O    (Results Pending)   MR-LUMBAR SPINE-WITH & W/O    (Results Pending)        Assessment/Plan  Numbness and tingling in right hand- (present on admission)  Assessment & Plan  Also with numbness and tingling of right side of face and intermittent right foot weakness.   Unclear etiology   Initial CT head: negative  MRI brain pending.  MRI  Spine pending.   Echo wnl  Asa, statin  Neuro following      Anxiety- (present on admission)  Assessment & Plan  Continue zoloft.          VTE prophylaxis: lovenox.

## 2021-06-08 NOTE — PROGRESS NOTES
Assumed care of patient at 0700; bedside report from NOC RN. Updated on POC. Patient currently A & O x 4; on RA; up independently; without complaints of acute pain. Assessment completed. Call light within reach. Whiteboard updated. Fall precautions in place. Bed locked and in lowest position. All questions answered. No other needs indicated at this time.

## 2021-06-08 NOTE — TELEPHONE ENCOUNTER
1. Caller Name: Dominick Cuadra                          Call Back Number: [phone] (home)         How would the patient prefer to be contacted with a response: Phone call do NOT leave a detailed message    Late entry*   I recieved a call from Dominick's girlfriend stating he was slurring his speech and would not go to the hospital till he heard from Ness, upon consulting with Ness she advised me to call Dominick and tell him to go straight to the ED. Dominick agreed and has been admitted.   normal...

## 2021-06-09 DIAGNOSIS — G35 MULTIPLE SCLEROSIS (HCC): ICD-10-CM

## 2021-06-09 LAB
GLUCOSE BLD-MCNC: 115 MG/DL (ref 65–99)
GLUCOSE BLD-MCNC: 146 MG/DL (ref 65–99)
GLUCOSE BLD-MCNC: 149 MG/DL (ref 65–99)
NUCLEAR IGG SER QL IA: NORMAL

## 2021-06-09 PROCEDURE — A9270 NON-COVERED ITEM OR SERVICE: HCPCS | Performed by: PSYCHIATRY & NEUROLOGY

## 2021-06-09 PROCEDURE — 302128 INFUSION PUMP W/POLE: Performed by: NURSE PRACTITIONER

## 2021-06-09 PROCEDURE — 700105 HCHG RX REV CODE 258: Performed by: PSYCHIATRY & NEUROLOGY

## 2021-06-09 PROCEDURE — 770001 HCHG ROOM/CARE - MED/SURG/GYN PRIV*

## 2021-06-09 PROCEDURE — 700102 HCHG RX REV CODE 250 W/ 637 OVERRIDE(OP): Performed by: PSYCHIATRY & NEUROLOGY

## 2021-06-09 PROCEDURE — 700111 HCHG RX REV CODE 636 W/ 250 OVERRIDE (IP): Performed by: INTERNAL MEDICINE

## 2021-06-09 PROCEDURE — 700102 HCHG RX REV CODE 250 W/ 637 OVERRIDE(OP): Performed by: INTERNAL MEDICINE

## 2021-06-09 PROCEDURE — 96365 THER/PROPH/DIAG IV INF INIT: CPT

## 2021-06-09 PROCEDURE — 82962 GLUCOSE BLOOD TEST: CPT

## 2021-06-09 PROCEDURE — 96372 THER/PROPH/DIAG INJ SC/IM: CPT

## 2021-06-09 PROCEDURE — 99226 PR SUBSEQUENT OBSERVATION CARE,LEVEL III: CPT | Performed by: NURSE PRACTITIONER

## 2021-06-09 PROCEDURE — 700111 HCHG RX REV CODE 636 W/ 250 OVERRIDE (IP): Performed by: PSYCHIATRY & NEUROLOGY

## 2021-06-09 PROCEDURE — 99233 SBSQ HOSP IP/OBS HIGH 50: CPT | Performed by: NURSE PRACTITIONER

## 2021-06-09 PROCEDURE — 700102 HCHG RX REV CODE 250 W/ 637 OVERRIDE(OP): Performed by: NURSE PRACTITIONER

## 2021-06-09 PROCEDURE — A9270 NON-COVERED ITEM OR SERVICE: HCPCS | Performed by: NURSE PRACTITIONER

## 2021-06-09 PROCEDURE — A9270 NON-COVERED ITEM OR SERVICE: HCPCS | Performed by: INTERNAL MEDICINE

## 2021-06-09 RX ORDER — DEXTROSE MONOHYDRATE 25 G/50ML
50 INJECTION, SOLUTION INTRAVENOUS
Status: DISCONTINUED | OUTPATIENT
Start: 2021-06-09 | End: 2021-06-11 | Stop reason: HOSPADM

## 2021-06-09 RX ORDER — INSULIN LISPRO 100 [IU]/ML
1-6 INJECTION, SOLUTION INTRAVENOUS; SUBCUTANEOUS
Status: DISCONTINUED | OUTPATIENT
Start: 2021-06-09 | End: 2021-06-11 | Stop reason: HOSPADM

## 2021-06-09 RX ORDER — ERGOCALCIFEROL 1.25 MG/1
50000 CAPSULE ORAL
Status: DISCONTINUED | OUTPATIENT
Start: 2021-06-09 | End: 2021-06-11 | Stop reason: HOSPADM

## 2021-06-09 RX ORDER — INSULIN LISPRO 100 [IU]/ML
0.2 INJECTION, SOLUTION INTRAVENOUS; SUBCUTANEOUS
Status: DISCONTINUED | OUTPATIENT
Start: 2021-06-09 | End: 2021-06-09

## 2021-06-09 RX ORDER — INSULIN LISPRO 100 [IU]/ML
1-6 INJECTION, SOLUTION INTRAVENOUS; SUBCUTANEOUS
Status: DISCONTINUED | OUTPATIENT
Start: 2021-06-09 | End: 2021-06-09

## 2021-06-09 RX ORDER — DEXTROSE MONOHYDRATE 25 G/50ML
50 INJECTION, SOLUTION INTRAVENOUS
Status: DISCONTINUED | OUTPATIENT
Start: 2021-06-09 | End: 2021-06-09

## 2021-06-09 RX ORDER — FAMOTIDINE 20 MG/1
20 TABLET, FILM COATED ORAL 2 TIMES DAILY
Status: DISCONTINUED | OUTPATIENT
Start: 2021-06-09 | End: 2021-06-11 | Stop reason: HOSPADM

## 2021-06-09 RX ADMIN — SERTRALINE HYDROCHLORIDE 50 MG: 50 TABLET ORAL at 21:00

## 2021-06-09 RX ADMIN — ERGOCALCIFEROL 50000 UNITS: 1.25 CAPSULE ORAL at 13:50

## 2021-06-09 RX ADMIN — ASPIRIN 325 MG ORAL TABLET 325 MG: 325 PILL ORAL at 06:04

## 2021-06-09 RX ADMIN — Medication 1000 UNITS: at 06:04

## 2021-06-09 RX ADMIN — SODIUM CHLORIDE 1000 MG: 9 INJECTION, SOLUTION INTRAVENOUS at 13:46

## 2021-06-09 RX ADMIN — SERTRALINE HYDROCHLORIDE 50 MG: 50 TABLET ORAL at 00:03

## 2021-06-09 RX ADMIN — ENOXAPARIN SODIUM 40 MG: 40 INJECTION SUBCUTANEOUS at 06:04

## 2021-06-09 RX ADMIN — FAMOTIDINE 20 MG: 20 TABLET ORAL at 17:38

## 2021-06-09 ASSESSMENT — ENCOUNTER SYMPTOMS
SENSORY CHANGE: 1
FOCAL WEAKNESS: 1
DOUBLE VISION: 0
HEADACHES: 0
SHORTNESS OF BREATH: 0
CHILLS: 0
TREMORS: 0
FEVER: 0
DIZZINESS: 0
ABDOMINAL PAIN: 0
NAUSEA: 0
EYE DISCHARGE: 0
DIARRHEA: 0
MYALGIAS: 0
TINGLING: 0
BLURRED VISION: 0
VOMITING: 0

## 2021-06-09 NOTE — CARE PLAN
The patient is stable, low risk for patient's condition declining.         Progress made toward(s) clinical / shift goals:  neuro back to baseline      Problem: Optimal Care of the Stroke Patient  Goal: Optimal acute care for the stroke patient  Outcome: Progressing     Problem: Knowledge Deficit - Stroke Education  Goal: Patient's knowledge of stroke and risk factors will improve  Outcome: Progressing     Problem: Neuro Status  Goal: Neuro status will remain stable or improve  Outcome: Progressing     Problem: Hemodynamic Monitoring  Goal: Patient's hemodynamics, fluid balance and neurologic status will be stable or improve  Outcome: Progressing

## 2021-06-09 NOTE — PROGRESS NOTES
[facility] Medicine Daily Progress Note    Date of Service  6/9/2021    Chief Complaint  40 y.o. male admitted 6/7/2021 with right face and upper extremity numbness and tingling.    Hospital Course  This is a 40-year-old male with a past medical history of alcohol abuse admitted with complaints of right face and right upper extremity numbness and tingling ongoing for approximately 3 weeks.  He also reports intermittent right foot weakness and tripping over his foot.  He also endorsed new slurred speech ongoing for 2 days prior to admission.  Neurology consulted.  MRI brain and spine pending.    Interval Problem Update  6/8: No change in neurological symptoms.  MRI is pending.  Evaluated by therapies with no acute therapy needs.  Vital signs stable.  Afebrile.  6/9:  MRI findings concerning for active MS.  Further workup pending.  No acute changes to patient's symptoms.  No acute pain.  VSS.    Consultants/Specialty  Neurology.    Code Status  Full Code    Disposition  TBD.    Review of Systems  Review of Systems   Constitutional: Negative for chills, fever and malaise/fatigue.   HENT: Negative for congestion.    Eyes: Negative for blurred vision, double vision and discharge.   Respiratory: Negative for shortness of breath.    Cardiovascular: Negative for chest pain and leg swelling.   Gastrointestinal: Negative for abdominal pain, diarrhea, nausea and vomiting.   Genitourinary: Negative for dysuria and hematuria.   Musculoskeletal: Negative for myalgias.   Skin: Negative for itching and rash.   Neurological: Positive for sensory change (Right face and right upper extremity paresthesias.) and focal weakness (Interrmitent right foot weakness.). Negative for dizziness, tingling, tremors and headaches.        Physical Exam  Temp:  [36.1 °C (96.9 °F)-36.7 °C (98 °F)] 36.5 °C (97.7 °F)  Pulse:  [58-71] 71  Resp:  [16-20] 16  BP: ()/(54-69) 120/69  SpO2:  [94 %-96 %] 95 %    Physical Exam  Vitals and nursing note  reviewed.   Constitutional:       General: He is not in acute distress.     Appearance: Normal appearance. He is not toxic-appearing.   HENT:      Head: Normocephalic and atraumatic.      Nose: Nose normal.      Mouth/Throat:      Mouth: Mucous membranes are moist.      Pharynx: Oropharynx is clear. No oropharyngeal exudate.   Eyes:      General: No scleral icterus.        Right eye: No discharge.         Left eye: No discharge.      Conjunctiva/sclera: Conjunctivae normal.   Cardiovascular:      Rate and Rhythm: Normal rate and regular rhythm.      Pulses: Normal pulses.      Heart sounds: Normal heart sounds.   Pulmonary:      Effort: Pulmonary effort is normal. No respiratory distress.      Breath sounds: Normal breath sounds. No wheezing.   Abdominal:      General: Bowel sounds are normal. There is no distension.      Palpations: Abdomen is soft.      Tenderness: There is no abdominal tenderness. There is no guarding.   Musculoskeletal:         General: No tenderness. Normal range of motion.      Cervical back: Normal range of motion and neck supple. No rigidity or tenderness. No muscular tenderness.      Right lower leg: No edema.      Left lower leg: No edema.   Skin:     General: Skin is warm and dry.      Coloration: Skin is not jaundiced or pale.   Neurological:      Mental Status: He is alert and oriented to person, place, and time.      Comments: Right-sided paresthesias.  Intermittent right foot weakness.    Psychiatric:         Mood and Affect: Mood normal.         Behavior: Behavior normal.         Thought Content: Thought content normal.         Judgment: Judgment normal.         Fluids    Intake/Output Summary (Last 24 hours) at 6/9/2021 1516  Last data filed at 6/8/2021 2000  Gross per 24 hour   Intake 240 ml   Output --   Net 240 ml       Laboratory  Recent Labs     06/07/21  1428 06/08/21  0600   WBC 7.5 5.5   RBC 4.84 4.26*   HEMOGLOBIN 14.3 12.7*   HEMATOCRIT 42.0 38.2*   MCV 86.8 89.7   MCH  "29.5 29.8   MCHC 34.0 33.2*   RDW 38.4 39.1   PLATELETCT 285 254   MPV 8.9* 9.0     Recent Labs     06/07/21  1556 06/08/21  0600   SODIUM 138 142   POTASSIUM 4.0 3.6   CHLORIDE 106 113*   CO2 24 23   GLUCOSE 91 91   BUN 9 16   CREATININE 0.74 0.72   CALCIUM 8.9 7.2*     Recent Labs     06/07/21  1428   APTT 30.8   INR 0.96         Recent Labs     06/08/21  0600   TRIGLYCERIDE 61   HDL 26*   LDL 57       Imaging  MR-LUMBAR SPINE-WITH & W/O   Final Result      1.  No evidence of demyelinating lesions in the lower thoracic cord or conus medullaris.      2.  Mild discal degenerative changes at the L5-S1 level with minimal annular fissure in the annulus fibrosis posteriorly.      3.  Minimal annular bulging at the L4-5 and L5-S1 levels.      MR-THORACIC SPINE-WITH & W/O   Final Result         1. Mild disc space narrowing in the midthoracic region.      2. No evidence of demyelinating lesion in the thoracic cord.      MR-CERVICAL SPINE-WITH & W/O   Final Result      1.  Large area of demyelination involving the left superior cerebral peduncle consistent with multiple sclerosis.      2.  No evidence of demyelinating lesion in the cervical cord.      3.  Mild to moderate cervical spondylotic changes at C5-6 and C6-7 levels which causes a borderline central canal stenosis.      4.  Moderate left-sided neural foraminal narrowing at C5-6 level.      5.  Severe left-sided neural foraminal narrowing at the C5-6 level      MR-BRAIN-WITH & W/O   Final Result         1.  Large heterogeneous region of \"active\" demyelination involving the left superior cerebral peduncle consistent with the patient's known diagnosis of multiple sclerosis.      2.  Multiple ovoid and hazy areas of increased T2 signal intensity in the periventricular and juxtacortical white matter consistent with patient's known diagnosis of multiple sclerosis.      EC-ECHOCARDIOGRAM COMPLETE W/O CONT   Final Result      CT-CTA NECK WITH & W/O-POST PROCESSING   Final " Result      No high-grade stenosis, large vessel occlusion, aneurysm or dissection.      CT-CTA HEAD WITH & W/O-POST PROCESS   Final Result      No large vessel occlusion, high-grade stenosis or aneurysm of the [place].      CT-HEAD W/O   Final Result      No CT evidence of acute infarct, hemorrhage or mass.      DX-CHEST-PORTABLE (1 VIEW)   Final Result      No acute cardiopulmonary abnormality.           Assessment/Plan  Numbness and tingling in right hand- (present on admission)  Assessment & Plan  Also with numbness and tingling of right side of face and intermittent right foot weakness.   Unclear etiology   Initial CT head: negative  MRI findings concerning for active MS  Echo wnl  Neuro following  Pending LP with fluid analysis  Initiated high dose IV steroids x 5 days.       Anxiety- (present on admission)  Assessment & Plan  Continue zoloft.        VTE prophylaxis: lovenox.

## 2021-06-09 NOTE — PROGRESS NOTES
Bedside report received 0700. POC discussed with pt; Pt denies pain; Neuro remain unchanged from previous assessment; Numbness to right face and hand; No overnight events; Pending MRI results;all questions answered at this time.

## 2021-06-09 NOTE — DISCHARGE PLANNING
Follow up for post acute services. Chart review indicate no SLP need No physical therapy need. Anticipate outpatient follow up when medically cleared. No physiatry consult ordered per  protocol.

## 2021-06-09 NOTE — PROGRESS NOTES
Neurology Progress Note  Neurohospitalist Service, [facility]    Referring Physician: Stu Wellington M.D.    Reason for referral: Progressive sensory changes to right face and hand x 3 weeks, frequent tripping over right foot x 2 weeks, slurred speech x 2 days    HPI: Dominick Cuadra is a 40 y.o. man with past history of alcohol abuse now sober x 6 years who presented to [facility] today at the prompting of his girlfriend for c/o sensory changes to right face and hand x 3 weeks, frequent tripping over right foot x 2 weeks, and slurred speech x 2 days and for whom neurology has been consulted for evalutation of the above. He reports he has been in his usual state of health up until 3 weeks ago. He first noticed a pins and needles sensation across his right face which has remained constant for 3 weeks, he then developed pins and needles sensation in his right hand which has also been constant and present for ~2.5 weeks. He reports for at least 2 weeks he has noticed his right foot seems clumsy and has caused him to trip over his right foot on several occasions without subsequent injury sustained. He states that for the past 2 days his girlfriend has been telling him he has slurred speech, and he agrees his speech sounds unusual to him. He denies headache. He denies drug use past and present. 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    Interval Note 6/9: No acute events overnight, no new complaints, existing complaints remain constant without change.    Review of systems: In addition to what is detailed in the HPI above, all other systems reviewed and are negative.    Past Medical History:    has a past medical history of Alcohol abuse and Anxiety.    FHx:  He denies FH of MS or stroke in the young.  family history includes Alcohol/Drug in his father; Cancer in his maternal uncle; Heart Attack in his father, paternal  grandfather, and paternal grandmother; Heart Disease in his father, paternal grandfather, and paternal grandmother; Hypertension in his father; No Known Problems in his maternal grandfather, maternal grandmother, mother, and sister.    SHx:   reports that he has been smoking. His smokeless tobacco use includes chew. He reports that he does not drink alcohol and does not use drugs.    Allergies:  No Known Allergies    Medications:    Current Facility-Administered Medications:   •  methylPREDNISolone sod succ (SOLU-MEDROL) 1,000 mg in  mL IVPB, 1 g, Intravenous, DAILY, Stuart Avitia M.D.  •  famotidine (PEPCID) tablet 20 mg, 20 mg, Oral, BID, Stuart Avitia M.D.  •  vitamin D (Ergocalciferol) (DRISDOL) capsule 50,000 Units, 50,000 Units, Oral, Q7 DAYS, Stuart Avitia M.D.  •  insulin lispro (AdmeLOG) injection, 1-6 Units, Subcutaneous, 4X/DAY ACHS **AND** POC blood glucose manual result, , , Q AC AND BEDTIME(S) **AND** NOTIFY MD and PharmD, , , Once **AND** glucose 4 g chewable tablet 16 g, 16 g, Oral, Q15 MIN PRN **AND** dextrose 50% (D50W) injection 50 mL, 50 mL, Intravenous, Q15 MIN PRN, Jeffy Padilla, A.P.R.N.  •  vitamin D (cholecalciferol) tablet 1,000 Units, 1,000 Units, Oral, DAILY, Corinne E Canavero, A.P.R.N., 1,000 Units at 06/09/21 0604  •  sertraline (Zoloft) tablet 50 mg, 50 mg, Oral, DAILY, Stu Wellington M.D., 50 mg at 06/09/21 0003  •  senna-docusate (PERICOLACE or SENOKOT S) 8.6-50 MG per tablet 2 tablet, 2 tablet, Oral, BID **AND** polyethylene glycol/lytes (MIRALAX) PACKET 1 Packet, 1 Packet, Oral, QDAY PRN **AND** magnesium hydroxide (MILK OF MAGNESIA) suspension 30 mL, 30 mL, Oral, QDAY PRN **AND** bisacodyl (DULCOLAX) suppository 10 mg, 10 mg, Rectal, QDAY PRN, Stu Wellington M.D.  •  ondansetron (ZOFRAN) syringe/vial injection 4 mg, 4 mg, Intravenous, Q4HRS PRN, Stu Wellington M.D.  •  ondansetron (ZOFRAN ODT) dispertab 4 mg, 4 mg, Oral, Q4HRS PRN, Stu Wellington M.D.  •  promethazine  (PHENERGAN) tablet 12.5-25 mg, 12.5-25 mg, Oral, Q4HRS PRN, Stu Wellington M.D.  •  promethazine (PHENERGAN) suppository 12.5-25 mg, 12.5-25 mg, Rectal, Q4HRS PRN, Stu Wellington M.D.  •  prochlorperazine (COMPAZINE) injection 5-10 mg, 5-10 mg, Intravenous, Q4HRS PRN, Stu Wellington M.D.    Physical Examination:     Vitals:    06/08/21 1923 06/09/21 0001 06/09/21 0353 06/09/21 0744   BP: 110/64 110/66 (!) 94/54 107/57   Pulse: 64 63 68 63   Resp: 18 20 18 18   Temp: 36.1 °C (96.9 °F) 36.1 °C (97 °F) 36.6 °C (97.9 °F) 36.4 °C (97.5 °F)   TempSrc: Temporal Temporal Temporal Temporal   SpO2: 94% 95% 96% 95%   Weight:       Height:           General: Patient is awake and in no acute distress  Eyes: examination of optic disks not indicated at this time given acuity of consult  CV: RRR    NEUROLOGICAL EXAM:     Mental status: Awake and alert. Fully oriented, follows commands  Speech and language: very mild slurring. Speech is easy to understand. The patient is able to name and repeat.  Cranial nerve exam: Pupils are equal, round and reactive to light bilaterally. No APD. Visual fields are full. Extraocular muscles are intact. Sensation in the face is intact to light touch. Face is symmetric. Hearing to finger rub equal. Palate elevates symmetrically. Shoulder shrug is full. Tongue is midline.  Motor exam: Strength is 4/5 in the distal RUE, 5/5 proximally. All other extremities 5/5 both distally and proximally. Tone is normal. No abnormal movements were seen on exam.  Sensory exam: No sensory deficits identified   Deep tendon reflexes:  3+ and symmetric. Toes down-going bilaterally.  Coordination: no ataxia with finger to nose b/l  Gait: deferred given patient preference    Objective Data:    Labs:  Lab Results   Component Value Date/Time    PROTHROMBTM 13.1 06/07/2021 02:28 PM    INR 0.96 06/07/2021 02:28 PM      Lab Results   Component Value Date/Time    WBC 5.5 06/08/2021 06:00 AM    RBC 4.26 (L) 06/08/2021 06:00 AM     HEMOGLOBIN 12.7 (L) 06/08/2021 06:00 AM    HEMATOCRIT 38.2 (L) 06/08/2021 06:00 AM    MCV 89.7 06/08/2021 06:00 AM    MCH 29.8 06/08/2021 06:00 AM    MCHC 33.2 (L) 06/08/2021 06:00 AM    MPV 9.0 06/08/2021 06:00 AM    NEUTSPOLYS 54.90 06/07/2021 02:28 PM    LYMPHOCYTES 35.40 06/07/2021 02:28 PM    MONOCYTES 6.50 06/07/2021 02:28 PM    EOSINOPHILS 2.40 06/07/2021 02:28 PM    BASOPHILS 0.50 06/07/2021 02:28 PM      Lab Results   Component Value Date/Time    SODIUM 142 06/08/2021 06:00 AM    POTASSIUM 3.6 06/08/2021 06:00 AM    CHLORIDE 113 (H) 06/08/2021 06:00 AM    CO2 23 06/08/2021 06:00 AM    GLUCOSE 91 06/08/2021 06:00 AM    BUN 16 06/08/2021 06:00 AM    CREATININE 0.72 06/08/2021 06:00 AM      Lab Results   Component Value Date/Time    CHOLSTRLTOT 95 (L) 06/08/2021 06:00 AM    LDL 57 06/08/2021 06:00 AM    HDL 26 (A) 06/08/2021 06:00 AM    TRIGLYCERIDE 61 06/08/2021 06:00 AM       Lab Results   Component Value Date/Time    ALKPHOSPHAT 101 (H) 06/07/2021 03:56 PM    ASTSGOT 26 06/07/2021 03:56 PM    ALTSGPT 20 06/07/2021 03:56 PM    TBILIRUBIN 0.9 06/07/2021 03:56 PM        Imaging/Testing:    I interpreted and/or reviewed the patient's neuroimaging    EC-ECHOCARDIOGRAM COMPLETE W/O CONT   Final Result      CT-CTA NECK WITH & W/O-POST PROCESSING   Final Result      No high-grade stenosis, large vessel occlusion, aneurysm or dissection.      CT-CTA HEAD WITH & W/O-POST PROCESS   Final Result      No large vessel occlusion, high-grade stenosis or aneurysm of the [place].      CT-HEAD W/O   Final Result      No CT evidence of acute infarct, hemorrhage or mass.      DX-CHEST-PORTABLE (1 VIEW)   Final Result      No acute cardiopulmonary abnormality.      MR-BRAIN-WITH & W/O    (Results Pending)   MR-CERVICAL SPINE-WITH & W/O    (Results Pending)   MR-THORACIC SPINE-WITH & W/O    (Results Pending)   MR-LUMBAR SPINE-WITH & W/O    (Results Pending)       Assessment and Plan:    Dominick Cuadra is a 40 y.o.  man without significant past medical history presenting with c/o sensory changes to right face and hand x 3 weeks, frequent tripping over right foot x 2 weeks, and slurred speech x 2 days and for whom neurology has been consulted for evalutation of the above. Differential diagnoses include MS vs. Stroke vs. MS mimic such as ADEM, SLE, lyme's disease, neurosyphilis. MRI of the brain and spine with and without contrast was obtained. Formal radiology read is pending, however MRI Brain w/o and w/o is concerning for MS. The patient received lovenox this morning for DVT prophylaxis, thus LP is planned for tomorrow. Empiric treatment with high dose steroids has been started and should be continued for 5 day course. CSF studies ordered and include evaluation for lyme disease and cryptococcal infection given history of exposure. Serum studies have revealed normal homocysteine level, ESR and TSH, HIV and RPR were nonreactive, Vitamin B12 is not deficient. Vitamin D is deficient with value 18, replacement has been ordered. MS profile, SPEP w/ reflex, MIKKI, and Lyme PCR remain pending. He remains admitted to the floor in stable condition and neurology will continue to follow.    PLAN:  -Lumbar puncture planned for 6/9.  -CSF analysis   -cytology, protein, glucose, culture, cell count   -VDRL   -NMO AB   -oligoclonal bands   -lyme disease AB   -cryptococcal AB   -autoimmune (ENC2) panel   -meningoencephalitis panel   -West Nile  -Vitamin D 1000u Daily, recommend level be reassessed in 3 months  -Serum studies ordered and pending:   MS profile    SPEP w/ reflex   MIKKI   Lyme PCR    Anti-MOG    The evaluation of the patient, and recommended management, was discussed with attending neurologist, Dr. Stuart Avitia.    Corinne Canavero, APRN  Acute Care Neurohospitalist Service

## 2021-06-10 PROBLEM — R51.9 HEADACHE: Status: ACTIVE | Noted: 2021-06-10

## 2021-06-10 LAB
APTT PPP: 29.3 SEC (ref 24.7–36)
ARSENIC BLD-MCNC: <10 UG/L
BURR CELLS/RBC NFR CSF MANUAL: 0 %
C GATTII+NEOFOR DNA CSF QL NAA+NON-PROBE: NOT DETECTED
CADMIUM BLD-MCNC: <1 UG/L
CLARITY CSF: CLEAR
CMV DNA CSF QL NAA+NON-PROBE: NOT DETECTED
COLOR CSF: COLORLESS
COLOR SPUN CSF: COLORLESS
CRYPTOC AG CSF QL LA: NEGATIVE
CSF COMMENTS 1658: NORMAL
E COLI K1 DNA CSF QL NAA+NON-PROBE: NOT DETECTED
EV RNA CSF QL NAA+NON-PROBE: NOT DETECTED
GLUCOSE BLD-MCNC: 136 MG/DL (ref 65–99)
GLUCOSE BLD-MCNC: 154 MG/DL (ref 65–99)
GLUCOSE BLD-MCNC: 167 MG/DL (ref 65–99)
GLUCOSE BLD-MCNC: 184 MG/DL (ref 65–99)
GLUCOSE CSF-MCNC: 96 MG/DL (ref 40–80)
GP B STREP DNA CSF QL NAA+NON-PROBE: NOT DETECTED
GRAM STN SPEC: NORMAL
HAEM INFLU DNA CSF QL NAA+NON-PROBE: NOT DETECTED
HHV6 DNA CSF QL NAA+NON-PROBE: NOT DETECTED
HSV DNA SPEC QL NAA+PROBE: NOT DETECTED
HSV1 DNA CSF QL NAA+NON-PROBE: NOT DETECTED
HSV2 DNA CSF QL NAA+NON-PROBE: NOT DETECTED
INR PPP: 0.97 (ref 0.87–1.13)
L MONOCYTOG DNA CSF QL NAA+NON-PROBE: NOT DETECTED
LEAD BLDV-MCNC: <2 UG/DL
LYMPHOCYTES NFR CSF: 87 %
MERCURY BLD-MCNC: <2.5 UG/L
MONONUC CELLS NFR CSF: 13 %
N MEN DNA CSF QL NAA+NON-PROBE: NOT DETECTED
PARECHOVIRUS A RNA CSF QL NAA+NON-PROBE: NOT DETECTED
PROT CSF-MCNC: 47 MG/DL (ref 15–45)
PROTHROMBIN TIME: 13.2 SEC (ref 12–14.6)
RBC # CSF: 4 CELLS/UL
S PNEUM DNA CSF QL NAA+NON-PROBE: NOT DETECTED
SIGNIFICANT IND 70042: NORMAL
SITE SITE: NORMAL
SOURCE SOURCE: NORMAL
SPECIMEN SOURCE: NORMAL
SPECIMEN VOL CSF: 31 ML
TUBE # CSF: 4
TUBE # CSF: 4
VZV DNA CSF QL NAA+NON-PROBE: NOT DETECTED
WBC # CSF: 2 CELLS/UL (ref 0–10)

## 2021-06-10 PROCEDURE — 770006 HCHG ROOM/CARE - MED/SURG/GYN SEMI*

## 2021-06-10 PROCEDURE — 82945 GLUCOSE OTHER FLUID: CPT

## 2021-06-10 PROCEDURE — 87070 CULTURE OTHR SPECIMN AEROBIC: CPT

## 2021-06-10 PROCEDURE — 82962 GLUCOSE BLOOD TEST: CPT

## 2021-06-10 PROCEDURE — 85730 THROMBOPLASTIN TIME PARTIAL: CPT

## 2021-06-10 PROCEDURE — 87205 SMEAR GRAM STAIN: CPT

## 2021-06-10 PROCEDURE — 700102 HCHG RX REV CODE 250 W/ 637 OVERRIDE(OP): Performed by: NURSE PRACTITIONER

## 2021-06-10 PROCEDURE — 82784 ASSAY IGA/IGD/IGG/IGM EACH: CPT

## 2021-06-10 PROCEDURE — 700111 HCHG RX REV CODE 636 W/ 250 OVERRIDE (IP): Performed by: PSYCHIATRY & NEUROLOGY

## 2021-06-10 PROCEDURE — A9270 NON-COVERED ITEM OR SERVICE: HCPCS | Performed by: NURSE PRACTITIONER

## 2021-06-10 PROCEDURE — 86255 FLUORESCENT ANTIBODY SCREEN: CPT | Mod: 91

## 2021-06-10 PROCEDURE — 85610 PROTHROMBIN TIME: CPT

## 2021-06-10 PROCEDURE — A9270 NON-COVERED ITEM OR SERVICE: HCPCS | Performed by: INTERNAL MEDICINE

## 2021-06-10 PROCEDURE — 86341 ISLET CELL ANTIBODY: CPT

## 2021-06-10 PROCEDURE — 83916 OLIGOCLONAL BANDS: CPT

## 2021-06-10 PROCEDURE — 84157 ASSAY OF PROTEIN OTHER: CPT

## 2021-06-10 PROCEDURE — 89051 BODY FLUID CELL COUNT: CPT

## 2021-06-10 PROCEDURE — 86403 PARTICLE AGGLUT ANTBDY SCRN: CPT

## 2021-06-10 PROCEDURE — 86788 WEST NILE VIRUS AB IGM: CPT

## 2021-06-10 PROCEDURE — 99233 SBSQ HOSP IP/OBS HIGH 50: CPT | Performed by: NURSE PRACTITIONER

## 2021-06-10 PROCEDURE — 700102 HCHG RX REV CODE 250 W/ 637 OVERRIDE(OP): Performed by: INTERNAL MEDICINE

## 2021-06-10 PROCEDURE — 86789 WEST NILE VIRUS ANTIBODY: CPT

## 2021-06-10 PROCEDURE — 700102 HCHG RX REV CODE 250 W/ 637 OVERRIDE(OP): Performed by: PSYCHIATRY & NEUROLOGY

## 2021-06-10 PROCEDURE — 87483 CNS DNA AMP PROBE TYPE 12-25: CPT

## 2021-06-10 PROCEDURE — 62270 DX LMBR SPI PNXR: CPT

## 2021-06-10 PROCEDURE — 700105 HCHG RX REV CODE 258: Performed by: PSYCHIATRY & NEUROLOGY

## 2021-06-10 PROCEDURE — 86592 SYPHILIS TEST NON-TREP QUAL: CPT

## 2021-06-10 PROCEDURE — 009U3ZX DRAINAGE OF SPINAL CANAL, PERCUTANEOUS APPROACH, DIAGNOSTIC: ICD-10-PCS | Performed by: HOSPITALIST

## 2021-06-10 PROCEDURE — A9270 NON-COVERED ITEM OR SERVICE: HCPCS | Performed by: PSYCHIATRY & NEUROLOGY

## 2021-06-10 PROCEDURE — 86618 LYME DISEASE ANTIBODY: CPT

## 2021-06-10 RX ORDER — LORAZEPAM 2 MG/ML
1 INJECTION INTRAMUSCULAR ONCE
Status: COMPLETED | OUTPATIENT
Start: 2021-06-10 | End: 2021-06-10

## 2021-06-10 RX ORDER — MORPHINE SULFATE 4 MG/ML
1 INJECTION, SOLUTION INTRAMUSCULAR; INTRAVENOUS ONCE
Status: COMPLETED | OUTPATIENT
Start: 2021-06-10 | End: 2021-06-10

## 2021-06-10 RX ORDER — SERTRALINE HYDROCHLORIDE 100 MG/1
100 TABLET, FILM COATED ORAL DAILY
Status: DISCONTINUED | OUTPATIENT
Start: 2021-06-10 | End: 2021-06-11 | Stop reason: HOSPADM

## 2021-06-10 RX ORDER — BUTALBITAL, ACETAMINOPHEN AND CAFFEINE 50; 325; 40 MG/1; MG/1; MG/1
1 TABLET ORAL EVERY 6 HOURS PRN
Status: DISCONTINUED | OUTPATIENT
Start: 2021-06-10 | End: 2021-06-11 | Stop reason: HOSPADM

## 2021-06-10 RX ADMIN — DOCUSATE SODIUM 50 MG AND SENNOSIDES 8.6 MG 2 TABLET: 8.6; 5 TABLET, FILM COATED ORAL at 05:29

## 2021-06-10 RX ADMIN — FAMOTIDINE 20 MG: 20 TABLET ORAL at 17:26

## 2021-06-10 RX ADMIN — INSULIN LISPRO 1 UNITS: 100 INJECTION, SOLUTION INTRAVENOUS; SUBCUTANEOUS at 18:36

## 2021-06-10 RX ADMIN — INSULIN LISPRO 1 UNITS: 100 INJECTION, SOLUTION INTRAVENOUS; SUBCUTANEOUS at 13:02

## 2021-06-10 RX ADMIN — Medication 1000 UNITS: at 05:30

## 2021-06-10 RX ADMIN — LORAZEPAM 1 MG: 2 INJECTION INTRAMUSCULAR; INTRAVENOUS at 11:18

## 2021-06-10 RX ADMIN — INSULIN LISPRO 1 UNITS: 100 INJECTION, SOLUTION INTRAVENOUS; SUBCUTANEOUS at 21:15

## 2021-06-10 RX ADMIN — SODIUM CHLORIDE 1000 MG: 9 INJECTION, SOLUTION INTRAVENOUS at 05:30

## 2021-06-10 RX ADMIN — BUTALBITAL, ACETAMINOPHEN, AND CAFFEINE 1 TABLET: 50; 325; 40 TABLET ORAL at 09:23

## 2021-06-10 RX ADMIN — SERTRALINE HYDROCHLORIDE 50 MG: 50 TABLET ORAL at 05:29

## 2021-06-10 RX ADMIN — SERTRALINE HYDROCHLORIDE 100 MG: 100 TABLET ORAL at 18:40

## 2021-06-10 RX ADMIN — FAMOTIDINE 20 MG: 20 TABLET ORAL at 05:29

## 2021-06-10 RX ADMIN — MORPHINE SULFATE 1 MG: 4 INJECTION INTRAVENOUS at 11:19

## 2021-06-10 RX ADMIN — DOCUSATE SODIUM 50 MG AND SENNOSIDES 8.6 MG 2 TABLET: 8.6; 5 TABLET, FILM COATED ORAL at 17:26

## 2021-06-10 ASSESSMENT — ENCOUNTER SYMPTOMS
COUGH: 0
BLURRED VISION: 0
FOCAL WEAKNESS: 1
SORE THROAT: 0
SHORTNESS OF BREATH: 0
CHILLS: 0
MYALGIAS: 0
DIZZINESS: 0
HEADACHES: 1
TINGLING: 0
DIAPHORESIS: 0
SENSORY CHANGE: 1
FEVER: 0
DIARRHEA: 0
DOUBLE VISION: 0
CONSTIPATION: 0
ABDOMINAL PAIN: 0
TREMORS: 0
NAUSEA: 0
VOMITING: 0
PALPITATIONS: 0

## 2021-06-10 ASSESSMENT — PATIENT HEALTH QUESTIONNAIRE - PHQ9
1. LITTLE INTEREST OR PLEASURE IN DOING THINGS: NOT AT ALL
SUM OF ALL RESPONSES TO PHQ9 QUESTIONS 1 AND 2: 0
2. FEELING DOWN, DEPRESSED, IRRITABLE, OR HOPELESS: NOT AT ALL

## 2021-06-10 ASSESSMENT — PAIN DESCRIPTION - PAIN TYPE
TYPE: ACUTE PAIN

## 2021-06-10 NOTE — CARE PLAN
The patient is Stable - Low risk of patient condition declining or worsening         Progress made toward(s) clinical / shift goals:  Pt educated regarding plan of care and medications. All questions answered.      Problem: Knowledge Deficit - Stroke Education  Goal: Patient's knowledge of stroke and risk factors will improve  Outcome: Progressing       Patient is not progressing towards the following goals:

## 2021-06-10 NOTE — PROGRESS NOTES
Transferred patient to [facility] bed 2 belongings and meds with Rn report given to Masha Rn receiving Rn tele was dced per dr diallo

## 2021-06-10 NOTE — PROCEDURES
Procedure note:    Landmarks were palpated and the L3-L4 interspace was identified. The skin overlying the area was sterilized using iodine and allowed to dry for at least 5 minutes. She was positioned upright in the seated position, leaning forwards over a tray table in a private exam room. Her skin was anesthetized using 5mL of 1% lidocaine solution. After approximately 3 minutes, the patient reported not feeling any pain in the area. The spinal needle was checked for integrity and inserted into the L3-L4 interspace. Clear CSF flowed easily after a first attempt and approximately 28cc's of fluid was collected. The stylet was re-inserted into the spinal needle and removed. The patient tolerated the procedure well and was laid flat for 45 minutes. He did not report any numbness, tingling, or headache status post. He remains admitted in stable condition.    Corinne E Canavero, A.P.R.N.   Acute Care Neurohospitalist Service

## 2021-06-10 NOTE — CARE PLAN
Problem: Knowledge Deficit - Stroke Education  Goal: Patient's knowledge of stroke and risk factors will improve  6/9/2021 1933 by Robyn Fried, R.N.  Outcome: Progressing  6/9/2021 1901 by Robyn Fried RDEZ.  Outcome: Progressing   The patient is Stable - Low risk of patient condition declining or worsening         Progress made toward(s) clinical / shift goals:  Pt educated regarding plan of care and medications. All questions answered.      Patient is not progressing towards the following goals:

## 2021-06-10 NOTE — PROGRESS NOTES
Brief Neurology Note    Orders placed for AM coags and IgA level (should the patient necessitate progression to IVIG treatment in the setting of steroid failure and/or disease progression)    Stuart Avitia MD  Neurohospitalist, Acute Care Services   of Neurology

## 2021-06-10 NOTE — PROGRESS NOTES
[facility] Medicine Daily Progress Note    Date of Service  6/10/2021    Chief Complaint  40 y.o. male admitted 6/7/2021 with right face and upper extremity numbness and tingling.    Hospital Course  This is a 40-year-old male with a past medical history of alcohol abuse admitted with complaints of right face and right upper extremity numbness and tingling ongoing for approximately 3 weeks.  He also reports intermittent right foot weakness and tripping over his foot.  He also endorsed new slurred speech ongoing for 2 days prior to admission.  Neurology consulted.  MRI brain and spine pending.    Interval Problem Update  6/8: No change in neurological symptoms.  MRI is pending.  Evaluated by therapies with no acute therapy needs.  Vital signs stable.  Afebrile.  6/9:  MRI findings concerning for active MS.  Further workup pending.  No acute changes to patient's symptoms.  No acute pain.  VSS.  6/10:  Pending LP today.  No changes in symptoms.  He does complain of headache.  VSS.     Consultants/Specialty  Neurology.    Code Status  Full Code    Disposition  TBD.    Review of Systems  Review of Systems   Constitutional: Negative for chills, diaphoresis, fever and malaise/fatigue.   HENT: Negative for congestion and sore throat.    Eyes: Negative for blurred vision and double vision.   Respiratory: Negative for cough and shortness of breath.    Cardiovascular: Negative for chest pain, palpitations and leg swelling.   Gastrointestinal: Negative for abdominal pain, constipation, diarrhea, nausea and vomiting.   Genitourinary: Negative for dysuria.   Musculoskeletal: Negative for joint pain and myalgias.   Skin: Negative for rash.   Neurological: Positive for sensory change (Right face and right upper extremity paresthesias.), focal weakness (Interrmitent right foot weakness.) and headaches. Negative for dizziness, tingling and tremors.        Physical Exam  Temp:  [36.1 °C (97 °F)-36.8 °C (98.2 °F)] 36.6 °C (97.8  °F)  Pulse:  [70-91] 80  Resp:  [16-18] 18  BP: (109-140)/(56-75) 121/75  SpO2:  [93 %-96 %] 93 %    Physical Exam  Vitals and nursing note reviewed.   Constitutional:       General: He is not in acute distress.     Appearance: Normal appearance. He is not ill-appearing.   HENT:      Head: Normocephalic and atraumatic.      Nose: Nose normal.      Mouth/Throat:      Mouth: Mucous membranes are moist.      Pharynx: Oropharynx is clear. No oropharyngeal exudate or posterior oropharyngeal erythema.   Eyes:      General:         Right eye: No discharge.         Left eye: No discharge.      Conjunctiva/sclera: Conjunctivae normal.   Cardiovascular:      Rate and Rhythm: Normal rate and regular rhythm.      Pulses: Normal pulses.      Heart sounds: Normal heart sounds. No murmur heard.     Pulmonary:      Effort: Pulmonary effort is normal. No respiratory distress.      Breath sounds: Normal breath sounds. No wheezing or rales.   Abdominal:      General: Bowel sounds are normal. There is no distension.      Palpations: Abdomen is soft.      Tenderness: There is no abdominal tenderness.   Musculoskeletal:         General: Normal range of motion.      Cervical back: Normal range of motion and neck supple. No rigidity. No muscular tenderness.      Right lower leg: No edema.      Left lower leg: No edema.   Skin:     General: Skin is warm and dry.      Coloration: Skin is not jaundiced or pale.   Neurological:      Mental Status: He is alert and oriented to person, place, and time.      Comments: Right-sided paresthesias.  Intermittent right foot weakness.    Psychiatric:         Mood and Affect: Mood normal.         Behavior: Behavior normal.         Thought Content: Thought content normal.         Judgment: Judgment normal.         Fluids    Intake/Output Summary (Last 24 hours) at 6/10/2021 0827  Last data filed at 6/9/2021 2000  Gross per 24 hour   Intake 240 ml   Output --   Net 240 ml       Laboratory  Recent Labs      "06/07/21  1428 06/08/21  0600   WBC 7.5 5.5   RBC 4.84 4.26*   HEMOGLOBIN 14.3 12.7*   HEMATOCRIT 42.0 38.2*   MCV 86.8 89.7   MCH 29.5 29.8   MCHC 34.0 33.2*   RDW 38.4 39.1   PLATELETCT 285 254   MPV 8.9* 9.0     Recent Labs     06/07/21  1556 06/08/21  0600   SODIUM 138 142   POTASSIUM 4.0 3.6   CHLORIDE 106 113*   CO2 24 23   GLUCOSE 91 91   BUN 9 16   CREATININE 0.74 0.72   CALCIUM 8.9 7.2*     Recent Labs     06/07/21  1428 06/10/21  0331   APTT 30.8 29.3   INR 0.96 0.97         Recent Labs     06/08/21  0600   TRIGLYCERIDE 61   HDL 26*   LDL 57       Imaging  MR-LUMBAR SPINE-WITH & W/O   Final Result      1.  No evidence of demyelinating lesions in the lower thoracic cord or conus medullaris.      2.  Mild discal degenerative changes at the L5-S1 level with minimal annular fissure in the annulus fibrosis posteriorly.      3.  Minimal annular bulging at the L4-5 and L5-S1 levels.      MR-THORACIC SPINE-WITH & W/O   Final Result         1. Mild disc space narrowing in the midthoracic region.      2. No evidence of demyelinating lesion in the thoracic cord.      MR-CERVICAL SPINE-WITH & W/O   Final Result      1.  Large area of demyelination involving the left superior cerebral peduncle consistent with multiple sclerosis.      2.  No evidence of demyelinating lesion in the cervical cord.      3.  Mild to moderate cervical spondylotic changes at C5-6 and C6-7 levels which causes a borderline central canal stenosis.      4.  Moderate left-sided neural foraminal narrowing at C5-6 level.      5.  Severe left-sided neural foraminal narrowing at the C5-6 level      MR-BRAIN-WITH & W/O   Final Result         1.  Large heterogeneous region of \"active\" demyelination involving the left superior cerebral peduncle consistent with the patient's known diagnosis of multiple sclerosis.      2.  Multiple ovoid and hazy areas of increased T2 signal intensity in the periventricular and juxtacortical white matter consistent with " patient's known diagnosis of multiple sclerosis.      EC-ECHOCARDIOGRAM COMPLETE W/O CONT   Final Result      CT-CTA NECK WITH & W/O-POST PROCESSING   Final Result      No high-grade stenosis, large vessel occlusion, aneurysm or dissection.      CT-CTA HEAD WITH & W/O-POST PROCESS   Final Result      No large vessel occlusion, high-grade stenosis or aneurysm of the [place].      CT-HEAD W/O   Final Result      No CT evidence of acute infarct, hemorrhage or mass.      DX-CHEST-PORTABLE (1 VIEW)   Final Result      No acute cardiopulmonary abnormality.           Assessment/Plan  Numbness and tingling in right hand- (present on admission)  Assessment & Plan  Also with numbness and tingling of right side of face and intermittent right foot weakness.   Unclear etiology   Initial CT head: negative  MRI findings concerning for active MS  Echo wnl  Neuro following  Pending LP with fluid analysis  Initiated high dose IV steroids x 5 days.       Headache  Assessment & Plan  Fioricet    Anxiety- (present on admission)  Assessment & Plan  Continue zoloft.        VTE prophylaxis: lovenox.

## 2021-06-10 NOTE — CARE PLAN
Problem: Optimal Care of the Stroke Patient  Goal: Optimal emergency care for the stroke patient  Outcome: Progressing     Problem: Knowledge Deficit - Stroke Education  Goal: Patient's knowledge of stroke and risk factors will improve  Outcome: Progressing     Problem: Psychosocial - Patient Condition  Goal: Patient's ability to verbalize feelings about condition will improve  Outcome: Progressing     Problem: Neuro Status  Goal: Neuro status will remain stable or improve  Outcome: Progressing     Problem: Mobility - Stroke  Goal: Patient's capacity to carry out activities will improve  Outcome: Progressing   The patient is Stable - Low risk of patient condition declining or worsening         Progress made toward(s) clinical / shift goals:  updated have lumbar puncture today     Patient is not progressing towards the following goals:

## 2021-06-11 VITALS
WEIGHT: 180.78 LBS | TEMPERATURE: 97.6 F | HEART RATE: 84 BPM | DIASTOLIC BLOOD PRESSURE: 64 MMHG | OXYGEN SATURATION: 93 % | SYSTOLIC BLOOD PRESSURE: 99 MMHG | RESPIRATION RATE: 16 BRPM | HEIGHT: 70 IN | BODY MASS INDEX: 25.88 KG/M2

## 2021-06-11 LAB
ALBUMIN SERPL ELPH-MCNC: 3.85 G/DL (ref 3.75–5.01)
ALPHA1 GLOB SERPL ELPH-MCNC: 0.24 G/DL (ref 0.19–0.46)
ALPHA2 GLOB SERPL ELPH-MCNC: 0.7 G/DL (ref 0.48–1.05)
B-GLOBULIN SERPL ELPH-MCNC: 0.68 G/DL (ref 0.48–1.1)
CYTOLOGY REG CYTOL: NORMAL
GAMMA GLOB SERPL ELPH-MCNC: 0.93 G/DL (ref 0.62–1.51)
GLUCOSE BLD-MCNC: 112 MG/DL (ref 65–99)
IGA SERPL-MCNC: 182 MG/DL (ref 68–408)
IGG SERPL-MCNC: 968 MG/DL (ref 768–1632)
IGM SERPL-MCNC: 77 MG/DL (ref 35–263)
INTERPRETATION SERPL IFE-IMP: NORMAL
MONOCLON BAND OBS SERPL: NORMAL
PATHOLOGY STUDY: NORMAL
PROT SERPL-MCNC: 6.4 G/DL (ref 6.3–8.2)
VIT B1 BLD-MCNC: 130 NMOL/L (ref 70–180)

## 2021-06-11 PROCEDURE — A9270 NON-COVERED ITEM OR SERVICE: HCPCS | Performed by: NURSE PRACTITIONER

## 2021-06-11 PROCEDURE — 700102 HCHG RX REV CODE 250 W/ 637 OVERRIDE(OP): Performed by: PSYCHIATRY & NEUROLOGY

## 2021-06-11 PROCEDURE — 82962 GLUCOSE BLOOD TEST: CPT

## 2021-06-11 PROCEDURE — 99232 SBSQ HOSP IP/OBS MODERATE 35: CPT | Performed by: NURSE PRACTITIONER

## 2021-06-11 PROCEDURE — 700102 HCHG RX REV CODE 250 W/ 637 OVERRIDE(OP): Performed by: NURSE PRACTITIONER

## 2021-06-11 PROCEDURE — 700105 HCHG RX REV CODE 258: Performed by: PSYCHIATRY & NEUROLOGY

## 2021-06-11 PROCEDURE — A9270 NON-COVERED ITEM OR SERVICE: HCPCS | Performed by: PSYCHIATRY & NEUROLOGY

## 2021-06-11 PROCEDURE — 99239 HOSP IP/OBS DSCHRG MGMT >30: CPT | Performed by: INTERNAL MEDICINE

## 2021-06-11 PROCEDURE — 700111 HCHG RX REV CODE 636 W/ 250 OVERRIDE (IP): Performed by: PSYCHIATRY & NEUROLOGY

## 2021-06-11 RX ORDER — PREDNISONE 20 MG/1
1000 TABLET ORAL DAILY
Qty: 100 TABLET | Refills: 0 | Status: SHIPPED | OUTPATIENT
Start: 2021-06-11 | End: 2021-06-11 | Stop reason: SDUPTHER

## 2021-06-11 RX ORDER — SERTRALINE HYDROCHLORIDE 100 MG/1
100 TABLET, FILM COATED ORAL DAILY
Qty: 30 TABLET | Refills: 0 | Status: SHIPPED | OUTPATIENT
Start: 2021-06-11 | End: 2021-07-29 | Stop reason: SDUPTHER

## 2021-06-11 RX ORDER — PREDNISONE 20 MG/1
1000 TABLET ORAL DAILY
Qty: 100 TABLET | Refills: 0 | Status: SHIPPED | OUTPATIENT
Start: 2021-06-11 | End: 2021-06-13

## 2021-06-11 RX ADMIN — Medication 1000 UNITS: at 06:25

## 2021-06-11 RX ADMIN — FAMOTIDINE 20 MG: 20 TABLET ORAL at 06:25

## 2021-06-11 RX ADMIN — SODIUM CHLORIDE 1000 MG: 9 INJECTION, SOLUTION INTRAVENOUS at 07:37

## 2021-06-11 NOTE — DISCHARGE INSTRUCTIONS
Discharge Instructions    Discharged to home by car with relative. Discharged via wheelchair, hospital escort: Yes.  Special equipment needed: Not Applicable    Be sure to schedule a follow-up appointment with your primary care doctor or any specialists as instructed.     Discharge Plan:        I understand that a diet low in cholesterol, fat, and sodium is recommended for good health. Unless I have been given specific instructions below for another diet, I accept this instruction as my diet prescription.   Other diet: as tolerated    Special Instructions:   Follow up with MD as instructed    · Is patient discharged on Warfarin / Coumadin?   No     Depression / Suicide Risk    As you are discharged from this [facility] facility, it is important to learn how to keep safe from harming yourself.    Recognize the warning signs:  · Abrupt changes in personality, positive or negative- including increase in energy   · Giving away possessions  · Change in eating patterns- significant weight changes-  positive or negative  · Change in sleeping patterns- unable to sleep or sleeping all the time   · Unwillingness or inability to communicate  · Depression  · Unusual sadness, discouragement and loneliness  · Talk of wanting to die  · Neglect of personal appearance   · Rebelliousness- reckless behavior  · Withdrawal from people/activities they love  · Confusion- inability to concentrate     If you or a loved one observes any of these behaviors or has concerns about self-harm, here's what you can do:  · Talk about it- your feelings and reasons for harming yourself  · Remove any means that you might use to hurt yourself (examples: pills, rope, extension cords, firearm)  · Get professional help from the community (Mental Health, Substance Abuse, psychological counseling)  · Do not be alone:Call your Safe Contact- someone whom you trust who will be there for you.  · Call your local CRISIS HOTLINE 347-0833 or 849-803-3844  · Call  your local Children's Mobile Crisis Response Team [place] (026) 723-4665 or www.exozet  · Call the toll free National Suicide Prevention Hotlines   · National Suicide Prevention Lifeline 371-148-MEWE (6554)  · [employer] Hope Line Network 800-SUICIDE (085-4328)      Multiple Sclerosis  Multiple sclerosis (MS) is a disease of the brain, spinal cord, and optic nerves (central nervous system). It causes the body's disease-fighting (immune) system to destroy the protective covering (myelin sheath) around nerves in the brain. When this happens, signals (nerve impulses) going to and from the brain and spinal cord do not get sent properly or may not get sent at all.  There are several types of MS:  · Relapsing-remitting MS. This is the most common type. This causes sudden attacks of symptoms. After an attack, you may recover completely until the next attack, or some symptoms may remain permanently.  · Secondary progressive MS. This usually develops after the onset of relapsing-remitting MS. Similar to relapsing-remitting MS, this type also causes sudden attacks of symptoms. Attacks may be less frequent, but symptoms slowly get worse (progress) over time.  · Primary progressive MS. This causes symptoms that steadily progress over time. This type of MS does not cause sudden attacks of symptoms.  The age of onset of MS varies, but it often develops between 20-40 years of age. MS is a lifelong (chronic) condition. There is no cure, but treatment can help slow down the progression of the disease.  What are the causes?  The cause of this condition is not known.  What increases the risk?  You are more likely to develop this condition if:  · You are a woman.  · You have a relative with MS. However, the condition is not passed from parent to child (inherited).  · You have a lack (deficiency) of vitamin D.  · You smoke.  MS is more common in the [place] than in the [place].  What are the  signs or symptoms?  Relapsing-remitting and secondary progressive MS cause symptoms to occur in episodes or attacks that may last weeks to months. There may be long periods between attacks in which there are almost no symptoms. Primary progressive MS causes symptoms to steadily progress after they develop.  Symptoms of MS vary because of the many different ways it affects the central nervous system. The main symptoms include:  · Vision problems and eye pain.  · Numbness.  · Weakness.  · Inability to move your arms, hands, feet, or legs (paralysis).  · Balance problems.  · Shaking that you cannot control (tremors).  · Muscle spasms.  · Problems with thinking (cognitive changes).  MS can also cause symptoms that are associated with the disease, but are not always the direct result of an MS attack. They may include:  · Inability to control urination or bowel movements (incontinence).  · Headaches.  · Fatigue.  · Inability to tolerate heat.  · Emotional changes.  · Depression.  · Pain.  How is this diagnosed?  This condition is diagnosed based on:  · Your symptoms.  · A neurological exam. This involves checking central nervous system function, such as nerve function, reflexes, and coordination.  · MRIs of the brain and spinal cord.  · Lab tests, including a lumbar puncture that tests the fluid that surrounds the brain and spinal cord (cerebrospinal fluid).  · Tests to measure the electrical activity of the brain in response to stimulation (evoked potentials).  How is this treated?  There is no cure for MS, but medicines can help decrease the number and frequency of attacks and help relieve nuisance symptoms. Treatment options may include:  · Medicines that reduce the frequency of attacks. These medicines may be given by injection, by mouth (orally), or through an IV.  · Medicines that reduce inflammation (steroids). These may provide short-term relief of symptoms.  · Medicines to help control pain, depression, fatigue,  or incontinence.  · Vitamin D, if you have a deficiency.  · Using devices to help you move around (assistive devices), such as braces, a cane, or a walker.  · Physical therapy to strengthen and stretch your muscles.  · Occupational therapy to help you with everyday tasks.  · Alternative or complementary treatments such as exercise, massage, or acupuncture.  Follow these instructions at home:  · Take over-the-counter and prescription medicines only as told by your health care provider.  · Do not drive or use heavy machinery while taking prescription pain medicine.  · Use assistive devices as recommended by your physical therapist or your health care provider.  · Exercise as directed by your health care provider.  · Return to your normal activities as told by your health care provider. Ask your health care provider what activities are safe for you.  · Reach out for support. Share your feelings with friends, family, or a support group.  · Keep all follow-up visits as told by your health care provider and therapists. This is important.  Where to find more information  · National Multiple Sclerosis Society: https://www.nationalmssociety.org  Contact a health care provider if:  · You feel depressed.  · You develop new pain or numbness.  · You have tremors.  · You have problems with sexual function.  Get help right away if:  · You develop paralysis.  · You develop numbness.  · You have problems with your bladder or bowel function.  · You develop double vision.  · You lose vision in one or both eyes.  · You develop suicidal thoughts.  · You develop severe confusion.  If you ever feel like you may hurt yourself or others, or have thoughts about taking your own life, get help right away. You can go to your nearest emergency department or call:  · Your local emergency services (911 in the [place]  · A suicide crisis helpline, such as the National Suicide Prevention Lifeline at 1-912.521.3278. This is open 24 hours a  day.  Summary  · Multiple sclerosis (MS) is a disease of the central nervous system that causes the body's immune system to destroy the protective covering (myelin sheath) around nerves in the brain.  · There are 3 types of MS: relapsing-remitting, secondary progressive, and primary progressive. Relapsing-remitting and secondary progressive MS cause symptoms to occur in episodes or attacks that may last weeks to months. Primary progressive MS causes symptoms to steadily progress after they develop.  · There is no cure for MS, but medicines can help decrease the number and frequency of attacks and help relieve nuisance symptoms. Treatment may also include physical or occupational therapy.  · If you develop numbness, paralysis, vision problems, or other neurological symptoms, get help right away.  This information is not intended to replace advice given to you by your health care provider. Make sure you discuss any questions you have with your health care provider.  Document Released: 12/15/2001 Document Revised: 11/30/2018 Document Reviewed: 02/26/2018  [employer] Patient Education © 2020 [employer]

## 2021-06-11 NOTE — DISCHARGE SUMMARY
Discharge Summary    CHIEF COMPLAINT ON ADMISSION  Chief Complaint   Patient presents with   • Possible Stroke       Reason for Admission  Slurred Speech     Admission Date  6/7/2021    CODE STATUS  Full Code    HPI & HOSPITAL COURSE  ER course  40 y.o. male with no significant past medical history who presented 6/7/2021 with right-sided tingling numbness sensation for the past 3 weeks.  It started out with right facial tingling numbness sensation and later on spread to right side of the body.  The patient denies any weakness over the extremities but complained about right foot drop.  Since yesterday he started having slurred speech.  Last week they went to see PCP for his ongoing symptoms.  Because of worsening symptom they came to ER where he had CT scan of the head and CT angiogram head and neck done without any acute finding.  Neurology was consulted and recommended further work-up.  Hospital course  Neurology was consulted and recommended MRI and findings were concerning for multiple sclerosis.  CSF study was done and found to have slight elevation of protein.  Some of the CSF study is still pending including oligoclonal band.  But per neurology the patient likely had multiple sclerosis and recommended high-dose of steroid during hospitalization.  His symptom has been improving on a daily basis.  Per neurology the patient can be discharged home today with prednisone 1000 mg po daily for 2 more days and follow-up with them as an outpatient.  I saw and examined the patient upon discharge. I also increased zoloft to 100 mg qday.  Please call [phone] to schedule PCP appointment for patient.    Required specialty appointments include:     As below  Therefore, he is discharged in fair and stable condition to home with close outpatient follow-up.    The patient met 2-midnight criteria for an inpatient stay at the time of discharge.    Discharge Date  06/11/21      FOLLOW UP ITEMS POST DISCHARGE      DISCHARGE  DIAGNOSES  Active Problems:    Anxiety POA: Yes    Numbness and tingling in right hand POA: Yes    Headache POA: Unknown  Resolved Problems:    * No resolved hospital problems. *      FOLLOW UP  Future Appointments   Date Time Provider Department Center   6/22/2021  3:00 PM Tom Koch M.D. [facility] None     Ness Duncan [facility]  [address] [facility] [id number] [phone]    In 1 week      Stuart Avitia M.D.  [address]  [address]  [place] [id number] [phone]    In 1 week        MEDICATIONS ON DISCHARGE     Medication List      START taking these medications      Instructions   predniSONE 20 MG Tabs  Commonly known as: DELTASONE   Take 50 Tablets by mouth every day for 2 days.  Dose: 1,000 mg     vitamin D 1000 UNIT Tabs  Start taking on: June 12, 2021  Commonly known as: VITAMIND D3   Take 1 tablet by mouth every day.  Dose: 1,000 Units        CHANGE how you take these medications      Instructions   sertraline 100 MG Tabs  What changed:   · medication strength  · how much to take  · when to take this  Commonly known as: Zoloft   Take 1 tablet by mouth every day.  Dose: 100 mg        CONTINUE taking these medications      Instructions   sildenafil citrate 100 MG tablet  Commonly known as: VIAGRA   Take 100 mg by mouth as needed for Erectile Dysfunction.  Dose: 100 mg        STOP taking these medications    amoxicillin 500 MG Caps  Commonly known as: AMOXIL            Allergies  No Known Allergies    DIET  Orders Placed This Encounter   Procedures   • Diet Order Diet: Regular     Standing Status:   Standing     Number of Occurrences:   1     Order Specific Question:   Diet:     Answer:   Regular [1]       ACTIVITY  As tolerated.  Weight bearing as tolerated    CONSULTATIONS  neuro    PROCEDURES      LABORATORY  Lab Results   Component Value Date    SODIUM 142 06/08/2021    POTASSIUM 3.6 06/08/2021    CHLORIDE 113 (H) 06/08/2021    CO2 23 06/08/2021    GLUCOSE 91 06/08/2021    BUN 16 06/08/2021     CREATININE 0.72 06/08/2021        Lab Results   Component Value Date    WBC 5.5 06/08/2021    HEMOGLOBIN 12.7 (L) 06/08/2021    HEMATOCRIT 38.2 (L) 06/08/2021    PLATELETCT 254 06/08/2021        Total time of the discharge process exceeds 38 minutes.

## 2021-06-11 NOTE — PROGRESS NOTES
DC instructions given and signed, education regarding MS given as well, questions about zoloft addressed.

## 2021-06-11 NOTE — PROGRESS NOTES
4 Eyes Skin Assessment Completed by Cindy RN and BEA Richardson.    Head WDL  Ears WDL  Nose WDL  Mouth WDL  Neck WDL  Breast/Chest WDL  Shoulder Blades WDL  Spine WDL LP site  (R) Arm/Elbow/Hand WDL  (L) Arm/Elbow/Hand WDL  Abdomen WDL  Groin WDL  Scrotum/Coccyx/Buttocks WDL  (R) Leg WDL  (L) Leg WDL  (R) Heel/Foot/Toe WDL  (L) Heel/Foot/Toe WDL              Interventions In Place Pillows    Possible Skin Injury No    Pictures Uploaded Into Epic N/A  Wound Consult Placed N/A  RN Wound Prevention Protocol Ordered No

## 2021-06-11 NOTE — CARE PLAN
The patient is Stable - Low risk of patient condition declining or worsening    Shift Goals  Clinical Goals: diagnostics, rest  Patient Goals: rest    Progress made toward(s) clinical / shift goals:  Lab draws done per order, pt able to rest with ear plugs throughout shift, educated on fall precautions.     Patient is not progressing towards the following goals:

## 2021-06-11 NOTE — CARE PLAN
The patient is Stable - Low risk of patient condition declining or worsening    Shift Goals  Clinical Goals: Discharge planning  Patient Goals: Discharge planning  Family Goals: NA    Progress made toward(s) clinical / shift goals:  Discharge planning discussed with pt and care team. Plan to discharge this AM.     Patient is not progressing towards the following goals:

## 2021-06-11 NOTE — PROGRESS NOTES
Neurology Progress Note  Neurohospitalist Service, [facility]    Referring Physician: Stu Wellington M.D.    Reason for referral: Progressive sensory changes to right face and hand x 3 weeks, frequent tripping over right foot x 2 weeks, slurred speech x 2 days    HPI: Dominick Cuadra is a 40 y.o. man with past history of alcohol abuse now sober x 6 years who presented to [facility] today at the prompting of his girlfriend for c/o sensory changes to right face and hand x 3 weeks, frequent tripping over right foot x 2 weeks, and slurred speech x 2 days and for whom neurology has been consulted for evalutation of the above. He reports he has been in his usual state of health up until 3 weeks ago. He first noticed a pins and needles sensation across his right face which has remained constant for 3 weeks, he then developed pins and needles sensation in his right hand which has also been constant and present for ~2.5 weeks. He reports for at least 2 weeks he has noticed his right foot seems clumsy and has caused him to trip over his right foot on several occasions without subsequent injury sustained. He states that for the past 2 days his girlfriend has been telling him he has slurred speech, and he agrees his speech sounds unusual to him. He denies headache. He denies drug use past and present. 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    Interval Note 6/11: No acute events overnight; the patient is continuing to report improved symptom of paraesthesias to face and right hand, tho still present mildly. He reports his right foot motor difficulty has improved completely. Speech has improved to baseline. Long discussion at bedside with Dominick Alvarez Jenny (s.o.) via phone, and patient's father, Maximino, regarding likely diagnosis and plan of care.     Review of systems: In addition to what is detailed in the HPI above, all other  systems reviewed and are negative.    Past Medical History:    has a past medical history of Alcohol abuse and Anxiety.    FHx:  Positive history of MS on father's side.  family history includes Alcohol/Drug in his father; Cancer in his maternal uncle; Heart Attack in his father, paternal grandfather, and paternal grandmother; Heart Disease in his father, paternal grandfather, and paternal grandmother; Hypertension in his father; No Known Problems in his maternal grandfather, maternal grandmother, mother, and sister.    SHx:   reports that he has been smoking. His smokeless tobacco use includes chew. He reports that he does not drink alcohol and does not use drugs.    Allergies:  No Known Allergies    Medications:    Current Facility-Administered Medications:   •  butalbital/apap/caffeine -40 mg (Fioricet) per tablet 1 tablet, 1 tablet, Oral, Q6HRS PRN, Jeffy Padilla, A.P.R.N., 1 tablet at 06/10/21 0923  •  sertraline (Zoloft) tablet 100 mg, 100 mg, Oral, DAILY, Corinne E Canavero, A.P.R.N., 100 mg at 06/10/21 1840  •  methylPREDNISolone sod succ (SOLU-MEDROL) 1,000 mg in  mL IVPB, 1 g, Intravenous, DAILY, Stuart Avitia M.D., Stopped at 06/11/21 0837  •  famotidine (PEPCID) tablet 20 mg, 20 mg, Oral, BID, Stuart Avitia M.D., 20 mg at 06/11/21 0625  •  vitamin D (Ergocalciferol) (DRISDOL) capsule 50,000 Units, 50,000 Units, Oral, Q7 DAYS, Stuart Avitia M.D., 50,000 Units at 06/09/21 1350  •  insulin lispro (AdmeLOG) injection, 1-6 Units, Subcutaneous, 4X/DAY ACHS, 1 Units at 06/10/21 2115 **AND** POC blood glucose manual result, , , Q AC AND BEDTIME(S) **AND** NOTIFY MD and PharmD, , , Once **AND** glucose 4 g chewable tablet 16 g, 16 g, Oral, Q15 MIN PRN **AND** dextrose 50% (D50W) injection 50 mL, 50 mL, Intravenous, Q15 MIN PRN, Jeffy Padilla, A.P.R.N.  •  vitamin D (cholecalciferol) tablet 1,000 Units, 1,000 Units, Oral, DAILY, Corinne E Canavero, A.P.R.N., 1,000 Units at 06/11/21 0625  •   senna-docusate (PERICOLACE or SENOKOT S) 8.6-50 MG per tablet 2 tablet, 2 tablet, Oral, BID, 2 tablet at 06/10/21 1726 **AND** polyethylene glycol/lytes (MIRALAX) PACKET 1 Packet, 1 Packet, Oral, QDAY PRN **AND** magnesium hydroxide (MILK OF MAGNESIA) suspension 30 mL, 30 mL, Oral, QDAY PRN **AND** bisacodyl (DULCOLAX) suppository 10 mg, 10 mg, Rectal, QDAY PRN, Stu Wellington M.D.  •  ondansetron (ZOFRAN) syringe/vial injection 4 mg, 4 mg, Intravenous, Q4HRS PRN, Stu Wellington M.D.  •  ondansetron (ZOFRAN ODT) dispertab 4 mg, 4 mg, Oral, Q4HRS PRN, Stu Wellington M.D.  •  promethazine (PHENERGAN) tablet 12.5-25 mg, 12.5-25 mg, Oral, Q4HRS PRN, Stu Wellington M.D.  •  promethazine (PHENERGAN) suppository 12.5-25 mg, 12.5-25 mg, Rectal, Q4HRS PRN, Stu Wellington M.D.  •  prochlorperazine (COMPAZINE) injection 5-10 mg, 5-10 mg, Intravenous, Q4HRS PRN, Stu Wellington M.D.    Physical Examination:     Vitals:    06/10/21 1954 06/10/21 2249 06/11/21 0336 06/11/21 0711   BP: 128/79 117/76 (!) 98/59 (!) 99/64   Pulse: 85 87 (!) 58 84   Resp: 18 17 16 16   Temp: 37.1 °C (98.7 °F) 36.4 °C (97.6 °F) 35.8 °C (96.5 °F) 36.4 °C (97.6 °F)   TempSrc: Temporal Temporal Temporal Temporal   SpO2: 94% 92% 95% 93%   Weight:       Height:           General: Patient is awake, in no acute distress.  Eyes: examination of optic disks not indicated at this time given acuity of consult  CV: RRR    NEUROLOGICAL EXAM:     Mental status: Awake and alert. Fully oriented, follows commands  Speech and language: Speech is no longer dysarthric. The patient is able to name and repeat.  Cranial nerve exam: Pupils are equal, round and reactive to light bilaterally. No APD. Visual fields are full. Extraocular muscles are intact. Sensation in the face is intact to light touch. Face is symmetric. Hearing to finger rub equal. Palate elevates symmetrically. Shoulder shrug is full. Tongue is midline.  Motor exam: Strength is 4+/5 in the distal RUE, 5/5 proximally. All other  extremities 5/5 both distally and proximally. Tone is normal. No abnormal movements were seen on exam.  Sensory exam: No sensory deficits identified   Deep tendon reflexes:  3+ and symmetric. Toes down-going bilaterally.  Coordination: no ataxia with finger to nose b/l  Gait: steady narrow gait with normal arm swing    Objective Data:    Labs:  Lab Results   Component Value Date/Time    PROTHROMBTM 13.2 06/10/2021 03:31 AM    INR 0.97 06/10/2021 03:31 AM      Lab Results   Component Value Date/Time    WBC 5.5 06/08/2021 06:00 AM    RBC 4.26 (L) 06/08/2021 06:00 AM    HEMOGLOBIN 12.7 (L) 06/08/2021 06:00 AM    HEMATOCRIT 38.2 (L) 06/08/2021 06:00 AM    MCV 89.7 06/08/2021 06:00 AM    MCH 29.8 06/08/2021 06:00 AM    MCHC 33.2 (L) 06/08/2021 06:00 AM    MPV 9.0 06/08/2021 06:00 AM    NEUTSPOLYS 54.90 06/07/2021 02:28 PM    LYMPHOCYTES 35.40 06/07/2021 02:28 PM    MONOCYTES 6.50 06/07/2021 02:28 PM    EOSINOPHILS 2.40 06/07/2021 02:28 PM    BASOPHILS 0.50 06/07/2021 02:28 PM      Lab Results   Component Value Date/Time    SODIUM 142 06/08/2021 06:00 AM    POTASSIUM 3.6 06/08/2021 06:00 AM    CHLORIDE 113 (H) 06/08/2021 06:00 AM    CO2 23 06/08/2021 06:00 AM    GLUCOSE 91 06/08/2021 06:00 AM    BUN 16 06/08/2021 06:00 AM    CREATININE 0.72 06/08/2021 06:00 AM      Lab Results   Component Value Date/Time    CHOLSTRLTOT 95 (L) 06/08/2021 06:00 AM    LDL 57 06/08/2021 06:00 AM    HDL 26 (A) 06/08/2021 06:00 AM    TRIGLYCERIDE 61 06/08/2021 06:00 AM       Lab Results   Component Value Date/Time    ALKPHOSPHAT 101 (H) 06/07/2021 03:56 PM    ASTSGOT 26 06/07/2021 03:56 PM    ALTSGPT 20 06/07/2021 03:56 PM    TBILIRUBIN 0.9 06/07/2021 03:56 PM        Imaging/Testing:    I interpreted and/or reviewed the patient's neuroimaging    MR-LUMBAR SPINE-WITH & W/O   Final Result      1.  No evidence of demyelinating lesions in the lower thoracic cord or conus medullaris.      2.  Mild discal degenerative changes at the L5-S1 level with  "minimal annular fissure in the annulus fibrosis posteriorly.      3.  Minimal annular bulging at the L4-5 and L5-S1 levels.      MR-THORACIC SPINE-WITH & W/O   Final Result         1. Mild disc space narrowing in the midthoracic region.      2. No evidence of demyelinating lesion in the thoracic cord.      MR-CERVICAL SPINE-WITH & W/O   Final Result      1.  Large area of demyelination involving the left superior cerebral peduncle consistent with multiple sclerosis.      2.  No evidence of demyelinating lesion in the cervical cord.      3.  Mild to moderate cervical spondylotic changes at C5-6 and C6-7 levels which causes a borderline central canal stenosis.      4.  Moderate left-sided neural foraminal narrowing at C5-6 level.      5.  Severe left-sided neural foraminal narrowing at the C5-6 level      MR-BRAIN-WITH & W/O   Final Result         1.  Large heterogeneous region of \"active\" demyelination involving the left superior cerebral peduncle consistent with the patient's known diagnosis of multiple sclerosis.      2.  Multiple ovoid and hazy areas of increased T2 signal intensity in the periventricular and juxtacortical white matter consistent with patient's known diagnosis of multiple sclerosis.      EC-ECHOCARDIOGRAM COMPLETE W/O CONT   Final Result      CT-CTA NECK WITH & W/O-POST PROCESSING   Final Result      No high-grade stenosis, large vessel occlusion, aneurysm or dissection.      CT-CTA HEAD WITH & W/O-POST PROCESS   Final Result      No large vessel occlusion, high-grade stenosis or aneurysm of the [place].      CT-HEAD W/O   Final Result      No CT evidence of acute infarct, hemorrhage or mass.      DX-CHEST-PORTABLE (1 VIEW)   Final Result      No acute cardiopulmonary abnormality.          Assessment and Plan:    Dominick Cuadra is a 40 y.o. man without significant past medical history presenting with c/o sensory changes to right face and hand x 3 weeks, frequent tripping over right " "foot x 2 weeks, and slurred speech x 2 days and for whom neurology has been consulted for evalutation of the above. Differential diagnoses considered include MS vs. Stroke vs. MS mimic such as ADEM, SLE, lyme's disease, neurosyphilis. MRI of the brain and spine with and without contrast was obtained, and is concerning for MS given multiple ovoid and hazy areas of increased T2 signal intensity in the periventricular and juxtacortical white matter and large heterogeneous region of \"active\" demyelination involving the left superior cerebral peduncle. Empiric treatment with high dose steroids was been started,and he completed a 3 day course of solumedrol 1000 mg IV. The patient is reporting improvement in symptoms; thus, would recommend he continue prednisone PO outpatient (discussed with Dr. Bill Cordova). LP performed yesterday. CSF reveals a mildly elevated protein. 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 Further CSF analysis is pending, including oligoclonal bands ( to be followed in neuroimmunology clinic with Dr. Cordova). Serum studies have revealed normal homocysteine level, ESR and TSH, HIV and RPR were nonreactive, Vitamin B12 is not deficient, MIKKI and Lyme not detected. Heavy metals negative. Vitamin D is deficient with value 18, replacement has been ordered, and he should be reassessed in 3 months. SPEP w/ reflex remain pending. Etiology is most likely MS. Zoloft increased from 50 mg po daily to 100 mg PO daily given the patient's report he is feeling depressed. Thankfully, the patient has a strong support system via his significant other, Charleen, and father, Maximino and additional family living locally. No further recommendations or further studies from an acute neurological standpoint at this time, the patient may be considered safe for discharge home from a neurological perspective with close follow up in Neuroimmunology Clinic.    PLAN:  -Prednisone " 1000 mg daily x 2 days starting 6/12 (#20, 50 mg tablets/day; no GI prophylaxis needed, no taper needed)  -CSF analysis pending to be followed in Neuroimmunology clinic  -Continue Vitamin D 1000u Daily, recommend level be reassessed in 3 months  -Zoloft increased from 50 mg po daily to 100 mg PO daily for symptoms of worsening depression  -Follow up with Dr. Bill Cordova, Neuroimmunology. Referral placed.    The evaluation of the patient, and recommended management, was discussed with attending neurologist, Dr. Stuart Avitia.    Corinne Canavero, APRN  Acute Care Neurohospitalist Service

## 2021-06-12 LAB — IGA SERPL-MCNC: 172 MG/DL (ref 68–408)

## 2021-06-13 LAB
B BURGDOR AB CSF IA-ACNC: 0.12 LIV
B BURGDOR DNA SPEC QL NAA+PROBE: NOT DETECTED
BACTERIA CSF CULT: NORMAL
GRAM STN SPEC: NORMAL
LYME SOURCE Q4169: NORMAL
SIGNIFICANT IND 70042: NORMAL
SITE SITE: NORMAL
SOURCE SOURCE: NORMAL
VDRL CSF QL: NON REACTIVE

## 2021-06-14 LAB
OLIGOCLONAL BANDS CSF ELPH-IMP: ABNORMAL
OLIGOCLONAL BANDS CSF IEF: 8 BANDS (ref 0–1)
OLIGOCLONAL BANDS.IT SER+CSF QL: POSITIVE
TEST NAME 95000: NORMAL
WNV IGG CSF IA-ACNC: 0.11 IV
WNV IGM CSF IA-ACNC: 0 IV

## 2021-06-15 LAB
ALB CSF/SERPL: 7.2 RATIO (ref 0–9)
ALBUMIN CSF-MCNC: 30 MG/DL (ref 0–35)
ALBUMIN SERPL-MCNC: 4164 MG/DL (ref 3500–5200)
IGG CSF-MCNC: 3.7 MG/DL (ref 0–6)
IGG SERPL-MCNC: 945 MG/DL (ref 768–1632)
IGG SYNTH RATE SER+CSF CALC-MRATE: <0 MG/D
IGG/ALB CLEAR SER+CSF-RTO: 0.54 RATIO (ref 0.28–0.66)
IGG/ALB CSF: 0.12 RATIO (ref 0.09–0.25)
OLIGOCLONAL BANDS CSF ELPH-IMP: POSITIVE
OLIGOCLONAL BANDS CSF IEF: 10 BANDS (ref 0–1)

## 2021-06-20 LAB — TEST NAME 95000: NORMAL

## 2021-06-22 ENCOUNTER — OFFICE VISIT (OUTPATIENT)
Dept: NEUROLOGY | Facility: MEDICAL CENTER | Age: 41
End: 2021-06-22
Attending: PSYCHIATRY & NEUROLOGY
Payer: COMMERCIAL

## 2021-06-22 VITALS
HEIGHT: 70 IN | HEART RATE: 92 BPM | WEIGHT: 174.16 LBS | DIASTOLIC BLOOD PRESSURE: 68 MMHG | TEMPERATURE: 97.1 F | RESPIRATION RATE: 14 BRPM | BODY MASS INDEX: 24.93 KG/M2 | SYSTOLIC BLOOD PRESSURE: 120 MMHG | OXYGEN SATURATION: 96 %

## 2021-06-22 DIAGNOSIS — G35 MULTIPLE SCLEROSIS (HCC): Primary | ICD-10-CM

## 2021-06-22 DIAGNOSIS — R51.9 HEADACHE IN FRONT OF HEAD: ICD-10-CM

## 2021-06-22 PROCEDURE — 99212 OFFICE O/P EST SF 10 MIN: CPT | Performed by: PSYCHIATRY & NEUROLOGY

## 2021-06-22 PROCEDURE — 99214 OFFICE O/P EST MOD 30 MIN: CPT | Performed by: PSYCHIATRY & NEUROLOGY

## 2021-06-22 ASSESSMENT — FIBROSIS 4 INDEX: FIB4 SCORE: 0.96

## 2021-06-22 NOTE — Clinical Note
Bill,   This gentleman will be seeing you soon.  Recently released from the hospital.  Has likely new diagnosis of BG Santos

## 2021-06-22 NOTE — PROGRESS NOTES
Reason for Consult:     History of present illness:    Dominick Cuadra 40 y.o. right handed gentleman who was recently diagnosed with M.S. based on  His sensory symptoms and MRI findings. CSF studies done in the hospital at [facility] including negative testing for Lyme (CSF) and West Nile Virus and a meningoencephalitis panel (also negative). Had less than 5 white cells in the CSF.  Entire spinal cord imaged>  IMPRESSION:   1.  Large area of demyelination involving the left superior cerebral peduncle consistent with multiple sclerosis.     2.  No evidence of demyelinating lesion in the cervical cord.     Brain MRI consistent with MS lesions as well:     There is a large heterogeneous area of increased T2 signal intensity involving the left superior cerebral peduncle. There is heterogeneous enhancement in this region. Additionally there are multiple ovoid areas of increased T2 signal intensity in the   periventricular and juxtacortical white matter. There is hazy increased T2 signal intensity noted in the peritrigonal white matter.      The postcontrast images show no areas of abnormal parenchymal or meningeal enhancement.   The brainstem and posterior fossa structures are unremarkable.    He has had headache(s) soon after the hospitalization (3 days after leaving [facility]).  The back of the neck feels swollen and ice feels better on his neck.    3 days IV (1 gram solumedrol) and then 2 days oral steroids given.    Prior numbness 50% better>> 3rd/4th fingers improved but still numb and the right of his face (also 50% better). No new numbness in the last week.    Over the last 3 days, he noticed pain (only on the forefoot area) sparing the toes and bottom of his left foot- persistent. Pain persistent.    He has been taking Ibuprofen (600 mg twice a day) for the 8 days.    No trauma known to the foot or ankle (left).    3-4 days ago- left foot swollen but this has not progressed in terms of degree of swelling.  No other  body parts swollen to date.          2017- numbness of the right side of his body lasting 5 days> thought he had a sinus infection    Patient Active Problem List    Diagnosis Date Noted   • Headache 06/10/2021   • Right leg numbness 06/01/2021   • Other insomnia 10/14/2019   • Vitamin D deficiency 10/05/2019   • Chronic fatigue 09/26/2019   • Numbness and tingling in right hand 05/19/2017   • Alcohol dependence in remission (HCC) 11/10/2016   • Anxiety 11/10/2016       Past medical history:   Past Medical History:   Diagnosis Date   • Alcohol abuse    • Anxiety        Past surgical history:   History reviewed. No pertinent surgical history.      Social history:   Social History     Socioeconomic History   • Marital status: Single     Spouse name: Not on file   • Number of children: Not on file   • Years of education: Not on file   • Highest education level: Not on file   Occupational History   • Not on file   Tobacco Use   • Smoking status: Former Smoker   • Smokeless tobacco: Current User     Types: Chew   Vaping Use   • Vaping Use: Never used   Substance and Sexual Activity   • Alcohol use: No     Comment: Quit October 2016   • Drug use: No   • Sexual activity: Yes     Partners: Female     Birth control/protection: Surgical     Comment: Girlfriend   Other Topics Concern   • Not on file   Social History Narrative     [employer]. No kids.     Social Determinants of Health     Financial Resource Strain:    • Difficulty of Paying Living Expenses:    Food Insecurity:    • Worried About Running Out of Food in the Last Year:    • Ran Out of Food in the Last Year:    Transportation Needs:    • Lack of Transportation (Medical):    • Lack of Transportation (Non-Medical):    Physical Activity:    • Days of Exercise per Week:    • Minutes of Exercise per Session:    Stress:    • Feeling of Stress :    Social Connections:    • Frequency of Communication with Friends and Family:    • Frequency of Social  Gatherings with Friends and Family:    • Attends [employer] Services:    • Active Member of Clubs or Organizations:    • Attends Club or Organization Meetings:    • Marital Status:    Intimate Partner Violence:    • Fear of Current or Ex-Partner:    • Emotionally Abused:    • Physically Abused:    • Sexually Abused:        Family history:   Family History   Problem Relation Age of Onset   • No Known Problems Mother    • Alcohol/Drug Father    • Heart Disease Father    • Hypertension Father    • Heart Attack Father    • No Known Problems Sister    • Cancer Maternal Uncle    • Heart Disease Paternal Grandfather    • Heart Attack Paternal Grandfather    • No Known Problems Maternal Grandmother    • No Known Problems Maternal Grandfather    • Heart Attack Paternal Grandmother    • Heart Disease Paternal Grandmother          Current medications:   Current Outpatient Medications   Medication   • sertraline (ZOLOFT) 100 MG Tab   • vitamin D (VITAMIND D3) 1000 UNIT Tab   • sildenafil citrate (VIAGRA) 100 MG tablet     No current facility-administered medications for this visit.       Medication Allergy:  No Known Allergies          ROS:  (In the last 2-4 weeks unless otherwise stated for an additional period of time).    Constitutional: Denies fevers,chills,sweats,involuntary and unprovoked/progressive  weight loss.  Eyes: Denies changes in vision (blurring,double or loss of) nor any eye pain(s)- static,evolving or with eye movements.  Ears/Nose/Throat/Mouth: Denies nasal congestion,sore throat,ear pain(s) or changes in hearing ability.  No tinnitus.  Cardiovascular: Denies chest pain/pressure at rest or with exertion such as climbing stairs or walking. No  palpitations. There has been no  orthostatic lightheadedness/faintness events.  Respiratory: Denies SOB with exertion or when sleeping (while laying down).  Gastrointestinal/Hepatic: Denies abdominal pain, nausea, vomiting, diarrhea, constipation or GI bleeding  "  Genitourinary: Denies bladder dysfunction, dysuria or frequency   Musculoskeletal/Rheum: Denies joint pain and swelling   Skin/Breast: Denies rash, denies breast lumps or discharge   Neurological: Denies new or evolving headaches, new or evolving confusion/disorientation, seizure like events, or focal weakness/parasthesias.  There has been no falls or near falls.  Psychiatric: Denies feeling anxious,depressed,suicidal or having auditory/visual hallucinations.  Endocrine: Denies hx of diabetes or thyroid dysfunction   Heme/Oncology/ Denies easy or spontaneous bruising/bleeding from nose,skin  or a known bleeding disorder   Allergic/Immunologic: Denies hx of allergies           Physical examination:   Vitals:    06/22/21 1449   BP: 120/68   BP Location: Right arm   Patient Position: Sitting   BP Cuff Size: Adult   Pulse: 92   Resp: 14   Temp: 36.2 °C (97.1 °F)   TempSrc: Temporal   SpO2: 96%   Weight: 79 kg (174 lb 2.6 oz)   Height: 1.778 m (5' 10\")       Normal Cephalic Atraumatic.  General: Full Range of Movement around the Neck in all directions without restrictions or discrete pain evoked triggers.  No lower extremity edema (left toes are not swollen ). Left foot is not discolored.  Full range of motion about the left foot in eversion,inversion,plantar and dorsiflexion.      Neurological  Exam:    Mental status: Awake, alert and fully oriented to person, place, time and situation. Normal attention, concentration and fund of knowledge for education level.  Did not appear/act combative,irritable,anxious,paranoid/delusional or aggressive to or with me.    Speech and language: Speech is clear.     Follows 3 step motor commands in sequence without significant delay and correctly.    Cranial nerve exam:  II: Pupils are equally round and reactive to light. Visual fields are intact by confrontation.  III, IV, VI: EOMI, no diplopia, no ptosis.  V: Sensation to light touch is normal over V1-3 distributions bilaterally.  " .  VII: Facial movements are symmetrical. There is no facial droop. .  VIII: Hearing intact to soft speech and finger rub bilaterally  IX: Palate elevates symmetrically, uvula is midline. Dysarthria is not present.  XI: Shoulder shrug are symmetrical and strong.   XII: Tongue protrudes midline.        Motor exam:  Muscle tone is normal in all 4 limbs. and No abnormal movements appreciated.    Muscle strength:    Neck Flexors/Extensors: 5/5       Right  Left  Deltoid   5/5  5/5      Biceps   5/5  5/5  Triceps  5/5  5/5   Wrist extensors 5/5  5/5  Wrist flexors  5/5  5/5     5/5  5/5  Interossei  5/5  5/5  Thenar (APB)  5/5  5/5   Hip flexors  5/5  5/5  Quadriceps  5/5  5/5    Hamstrings  5/5  5/5  Dorsiflexors  5/5  5/5  Plantarflexors  5/5  5/5  Toe extension  5/5  5/5  NT = not tested    Sensory exam:    Intact to Vibration and Proprioception in bilaterally lower extremity.    Vibratory threshold at great toes       Reflexes:       Right  Left  Biceps   2/4  2/4  Triceps  2/4  2/4  Brachioradialis 2/4  2/4  Knee jerk  2/4  2/4  Ankle jerk  2/4  2/4     Frontal release signs are absent.    bilaterally toes are downgoing to plantar stimulation..    Coordination (finger-to-nose, heel/knee/shin, rapid alternating movements) was normal.     There was no truncal ataxia, no tremors, and no dysmetria.     Station and gait - easily stands up from exam chair without retropulsion,veering,leaning,swaying (to either side).     Arm swing symmetrical.    No Rombergism.      Labs and Tests: Reviewed from last 30 days including CSF studies.     NEUROIMAGING:      Brain (CTL Spinal Cord) images reviewed today.    Impression/Plans/Recommendations:    1. Multiple Sclerosis- Radiographic findings consistent and CSF supportive.  Recent IV steroids x 3 days ( 1 gram) with 2 days oral.    Still has mild right limb ataxia (upper) and slight numbness of right 4th/5th fingers and part of face but these sensory disturbances are 50%  improved since admission.    No new neurological issues other than 8 days of frontal headaches (forehead) which started 3 days after his admission. No postural component (ie,not better or worse with standing from laying down) so doubt Post LP headaches.    No fever(s), visual disturbances or cognitive changes evolving or worsening in last 7-10 days.    No meningismus.    Due to headache(s), I am ordering a head CT non contrast since there is no clear as yet explanation for them.    I reviewed MRI(s) with Dominick and his wife today and briefly discussed disease modifying Rx(s)- copaxone vs other injectables.    He will going to see Dr. Cordova shortly to discuss in more depth disease  Modifying medications.          The patient understands and agrees that due to the complexity of his/her diagnosis, results of any testing and further recommendations will typically be discussed/made during a face to face encounter in my office and for simpler  matters either by a phone call or email correspondence. The patient and/or family further understands it is their responsibility to keep proper follow up as needed and when suggested by me.      I have performed  a history and physical exam and a directed /focused  ROS today.    Total time spent today or this patient's care was 38 minutes  and included reviewing diagnostic workup to date (labs and imaging that include interpreting such tests relevant to this patient's neurological condition),  reviewing/obtaining separately obtained history (from patient and/or accompanying wife for today's neurological problem(s)  and counseling and educating Dominick and his wife as well as  documenting the clinical information in the EMR.    Tom Koch MD  [place] [facility]- [facility] Medicine.   [facility]

## 2021-06-28 ENCOUNTER — HOSPITAL ENCOUNTER (OUTPATIENT)
Dept: RADIOLOGY | Facility: MEDICAL CENTER | Age: 41
End: 2021-06-28
Attending: PSYCHIATRY & NEUROLOGY
Payer: COMMERCIAL

## 2021-06-28 DIAGNOSIS — R51.9 HEADACHE IN FRONT OF HEAD: ICD-10-CM

## 2021-06-28 PROCEDURE — 70450 CT HEAD/BRAIN W/O DYE: CPT

## 2021-07-29 ENCOUNTER — OFFICE VISIT (OUTPATIENT)
Dept: NEUROLOGY | Facility: MEDICAL CENTER | Age: 41
End: 2021-07-29
Attending: PSYCHIATRY & NEUROLOGY
Payer: COMMERCIAL

## 2021-07-29 VITALS
BODY MASS INDEX: 26.45 KG/M2 | WEIGHT: 184.75 LBS | HEART RATE: 73 BPM | DIASTOLIC BLOOD PRESSURE: 78 MMHG | SYSTOLIC BLOOD PRESSURE: 126 MMHG | TEMPERATURE: 97.8 F | OXYGEN SATURATION: 97 % | HEIGHT: 70 IN

## 2021-07-29 DIAGNOSIS — F41.9 ANXIETY: ICD-10-CM

## 2021-07-29 DIAGNOSIS — G35 MULTIPLE SCLEROSIS (HCC): Primary | ICD-10-CM

## 2021-07-29 PROCEDURE — 99215 OFFICE O/P EST HI 40 MIN: CPT | Performed by: PSYCHIATRY & NEUROLOGY

## 2021-07-29 PROCEDURE — 99211 OFF/OP EST MAY X REQ PHY/QHP: CPT | Performed by: PSYCHIATRY & NEUROLOGY

## 2021-07-29 PROCEDURE — 99417 PROLNG OP E/M EACH 15 MIN: CPT | Performed by: PSYCHIATRY & NEUROLOGY

## 2021-07-29 RX ORDER — SERTRALINE HYDROCHLORIDE 100 MG/1
100 TABLET, FILM COATED ORAL DAILY
Qty: 90 TABLET | Refills: 3 | Status: SHIPPED | OUTPATIENT
Start: 2021-07-29 | End: 2022-09-21

## 2021-07-29 ASSESSMENT — FIBROSIS 4 INDEX: FIB4 SCORE: 0.96

## 2021-07-29 NOTE — TELEPHONE ENCOUNTER
Requested Prescriptions     Pending Prescriptions Disp Refills   • sertraline (ZOLOFT) 100 MG Tab 90 tablet 3     Sig: Take 1 tablet by mouth every day.       VELVET Mitchell.

## 2021-07-29 NOTE — PROGRESS NOTES
"[facility] NEUROLOGY  MULTIPLE SCLEROSIS & NEUROIMMUNOLOGY  NEW PATIENT VISIT    DISEASE SUMMARY:  Principal neurologic diagnosis: MS  Diagnosis of MS: 6/7/2021  Disease History:  - 5/16/2017: episode of numbness involving the right upper- and lower extremities  - 5/13/2021: episode of numbness involving the right face which progressed to include the right hand; right foot drop; slurred speech  - 6/7/2021: admitted at [facility]; workup included MRI CNS and LP; diagnosed w/ MS; treated with IVMP x3 days followed by oral steroids  Disease course at onset: relapsing  Current disease course: relapsing  Previous disease therapies:  - none  Current disease therapies:  - none; planning to start Ocrevus  Symptomatic therapies:  - none  CSF (6/10/2021):  - OCBs: positive (8, 10)  - RBCs: 4  - WBCs: 2  - protein: 47  - glucose: 96:  Other Testing:  - anti-AQP4 Ab:  - anti-MOG Ab:   MRI head:  - 6/7/2021: simultaneous presence of enhancing and non-enhancing lesions  MRI cervical spine:  - 6/7/2021: no visible lesions  MRI thoracic spine:  - 6/7/2021: no visible lesions    CC: MS    HISTORY OF ILLNESS:  Dominick Cuadra is a 40 y.o. man with a history most notable for alcohol dependence (in remission) vitamin D deficiency, and MS.  Today, he was accompanied by his girlfriend, and he provided the following history:    5/16/2017:  Dominick saw his PCP for numbness involving the right upper- and lower extremities.  These symptoms resolved spontaneously.    5/13/2021:  Dominick woke up with numbness involving the right face.  Over the course of three weeks his symptoms worsened to involve numbess in the right hand and right foot drop.  Finally, he developed \"slurred speech.\"    6/7/2021:  Dominick went to [facility].  Workup included MRI and lumbar puncture.  He was diagnosed with MS.  Treatment included 3 days of high-dose IVMP and 2 days of high-dose oral steroids.    MEDICAL AND SURGICAL HISTORY:  Past Medical History:   Diagnosis Date   • Alcohol " abuse    • Anxiety      History reviewed. No pertinent surgical history.  MEDICATIONS:  Current Outpatient Medications   Medication Sig   • pneumococcal 13-Tess Conj Vacc (PREVNAR 13) syringe Inject 0.5 mL into the shoulder, thigh, or buttocks one time as needed for up to 1 dose.   • sertraline (ZOLOFT) 100 MG Tab Take 1 tablet by mouth every day.   • vitamin D (VITAMIND D3) 1000 UNIT Tab Take 1 tablet by mouth every day. (Patient not taking: Reported on 6/22/2021)   • sildenafil citrate (VIAGRA) 100 MG tablet Take 100 mg by mouth as needed for Erectile Dysfunction. (Patient not taking: Reported on 6/22/2021)     SOCIAL HISTORY:  Social History     Tobacco Use   • Smoking status: Former Smoker   • Smokeless tobacco: Current User     Types: Chew   Substance Use Topics   • Alcohol use: No     Comment: Quit October 2016     Social History     Social History Narrative     [employer]. No kids.     FAMILY HISTORY:  Family History   Problem Relation Age of Onset   • No Known Problems Mother    • Alcohol/Drug Father    • Heart Disease Father    • Hypertension Father    • Heart Attack Father    • No Known Problems Sister    • Cancer Maternal Uncle    • Heart Disease Paternal Grandfather    • Heart Attack Paternal Grandfather    • No Known Problems Maternal Grandmother    • No Known Problems Maternal Grandfather    • Heart Attack Paternal Grandmother    • Heart Disease Paternal Grandmother    • Mult Sclerosis Paternal Uncle      REVIEW OF SYSTEMS:  A ROS was completed.  Pertinent positives and negatives were included in the HPI, above.  All other systems were reviewed and are negative.    PHYSICAL EXAM:  General/Medical:  - NAD  - hair, skin, nails, and joints were normal    Neuro:  MENTAL STATUS: awake and alert; no deficits of speech or language; oriented to person, place, and time; affect was appropriate to situation; pleasant, cooperative    CRANIAL NERVES:    II: acuity: J1/J1+, fields: intact to  "confrontation, pupils: 3/3 to 2/2 without a relative afferent pupillary defect, discs: sharp    III/IV/VI: versions: intact without nystagmus    V: facial sensation: symmetric to light touch    VII: facial expression: symmetric    VIII: hearing: intact to finger rub    IX/X: palate: elevates symmetrically    XI: shoulder shrug: symmetric    XII: tongue: midline    MOTOR:  - bulk: normal throughout  - tone: normal throughout  Upper Extremity Strength  (R/L)    5/5   Elbow flexion 5/5   Elbow extension 5/5   Shoulder abduction 5/5     Lower Extremity Strength  (R/L)   Hip flexion 5/5   Knee extension 5/5   Knee flexion 5/5   Ankle plantarflexion 5/5   Ankle dorsiflexion 5/5     - can walk on toes and heels  - pronator drift: absent  - abnormal movements: none    SENSATION:  - light touch: grossly intact over the upper and lower extremities  - vibration (R/L, seconds): 8/8 at the great toes  - pinprick: NT  - proprioception: NT  - Romberg: absent    COORDINATION:  - finger to nose: normal, no ataxia on exam  - finger tapping: rapid and accurate, bilaterally    REFLEXES:  Reflex Right Left   BR 2+ 2+   Biceps 2+ 2+   Triceps 2+ 2+   Patellae 2+ 2+   Achilles NT NT   Toes up up     GAIT:  - narrow base and normal  - heel-raised/toe-raised gait: intact/intact  - tandem gait: intact    QUANTITATIVE SCORES:  Timed 25-foot walk (sec): 3.9.  Assistive device: none    REVIEW OF IMAGING STUDIES: I reviewed the following studies:  MRI Brain:  Date: 6/7/2021  W/o and w/ contrast?: yes  Indication: \"MS, new event; right-sided extremity weakness and slurred speech\"  Comparison: CT head 6/7/2021  Impression:  \"1) Large heterogeneous region of \"active\" demyelination involving the left superior cerebral peduncle consistent with the patient's known diagnosis of multiple sclerosis.  2) Multiple ovoid and hazy areas of increased T2 signal intensity in the periventricular and juxtacortical white matter consistent with patient's known " "diagnosis of multiple sclerosis.\"    MRI Cervical Spine:  Date: 6/7/2021  W/o and w/ contrast?: yes  Indication: \"MS, monitor; right-sided weakness and slurred speech\"  Comparison: none  Impression:  \"1) Large area of demyelination involving the left superior cerebral peduncle consistent with multiple sclerosis.  2) No evidence of demyelinating lesion in the cervical cord.  3) Mild to moderate cervical spondylotic changes at C5-6 and C6-7 levels which causes a borderline central canal stenosis.  4) Moderate left-sided neural foraminal narrowing at C5-6 level.   5) Severe left-sided neural foraminal narrowing at the C5-6 level.\"    MRI Thoracic Spine:  Date: 6/7/2021  W/o and w/ contrast?: yes  Indication: \"spinal cord injury, follow-up\"  Comparison: none  Impression:  \"1) Mild disc space narrowing in the midthoracic region.  2) No evidence of demyelinating lesion in the thoracic cord.\"    REVIEW OF LABORATORY STUDIES:  6/10/2021:  CSF:  - OCBs: positive (8, 10)  - RBCs: 4  - WBCs: 2  - protein: 47  - glucose: 96    ASSESSMENT:  Dominick Cuadra is a 40 y.o. man with multiple sclerosis.  The diagnosis was made based on the history (there have been at least 2 clinical relapses), physical exam findings (reduced vibratory sensation of the toes and bilateral Babinski signs), and imaging studies (MRI brain with simultaneous presence of both an enhancing lesion and many nonenhancing lesions involving the juxtacortical, periventricular, and infratentorial regions).  Dominick has an impressive number of lesions in his brain including a very large lesion involving the brainstem.  Given his already high disease burden, I am in favor of treatment with a high potency immunotherapy.  We discussed oral, infused, and injectable (ofatumumab) medications at length including efficacy and risk/side effect profile.  Ultimately, we agreed to start ocrelizumab.  He completed the start form.  I have ordered the required pretreatment blood " "work.  And he has agreed to get the Prevnar 13 and the SARS-CoV-2 vaccines.  He should wait at least 2 weeks after the last vaccine dose to start ocrelizumab.  We will follow-up approximately 2 months after he receives his first infusion.    PLAN:  MS:  - plan to start Ocrevus  Orders Placed This Encounter   • CBC with differential   • complete metabolic panel   • hepatitis B surface Ab   • hepatitis b surface antigen   • hepatitis C antibody   • IgA   • IgG   • IgM   • lymphocyte subsets (B & T cells)   • quantiferon gold plus TB (4 tube)   • varicella zoster IgG Ab   • anti-NMO/AQP4 Ab   • anti-MOG Ab   • pneumococcal 13-Tess Conj Vacc (PREVNAR 13) syringe     Follow-Up:  - Return in about 3 months (around 10/29/2021).    Signed: Bill Cordova M.D.    BILLING DOCUMENTATION:   I spent 91 minutes reviewing the medical record, interviewing and examining the patient, discussing my impression (see \"assessment\" above), and coordinating care.  "

## 2021-07-29 NOTE — TELEPHONE ENCOUNTER
Received request via: Patient    Was the patient seen in the last year in this department? Yes    Does the patient have an active prescription (recently filled or refills available) for medication(s) requested? No     Patient is requesting multiple refills

## 2021-08-02 ENCOUNTER — HOSPITAL ENCOUNTER (OUTPATIENT)
Dept: LAB | Facility: MEDICAL CENTER | Age: 41
End: 2021-08-02
Attending: PHYSICAL MEDICINE & REHABILITATION
Payer: COMMERCIAL

## 2021-08-02 DIAGNOSIS — G35 MULTIPLE SCLEROSIS (HCC): ICD-10-CM

## 2021-08-02 LAB
ALBUMIN SERPL BCP-MCNC: 4.6 G/DL (ref 3.2–4.9)
ALBUMIN/GLOB SERPL: 1.9 G/DL
ALP SERPL-CCNC: 124 U/L (ref 30–99)
ALT SERPL-CCNC: 19 U/L (ref 2–50)
ANION GAP SERPL CALC-SCNC: 13 MMOL/L (ref 7–16)
AST SERPL-CCNC: 17 U/L (ref 12–45)
BASOPHILS # BLD AUTO: 0.5 % (ref 0–1.8)
BASOPHILS # BLD: 0.03 K/UL (ref 0–0.12)
BILIRUB SERPL-MCNC: 0.5 MG/DL (ref 0.1–1.5)
BUN SERPL-MCNC: 13 MG/DL (ref 8–22)
CALCIUM SERPL-MCNC: 9.7 MG/DL (ref 8.5–10.5)
CHLORIDE SERPL-SCNC: 107 MMOL/L (ref 96–112)
CO2 SERPL-SCNC: 20 MMOL/L (ref 20–33)
CREAT SERPL-MCNC: 0.84 MG/DL (ref 0.5–1.4)
EOSINOPHIL # BLD AUTO: 0.12 K/UL (ref 0–0.51)
EOSINOPHIL NFR BLD: 2 % (ref 0–6.9)
ERYTHROCYTE [DISTWIDTH] IN BLOOD BY AUTOMATED COUNT: 38.5 FL (ref 35.9–50)
GLOBULIN SER CALC-MCNC: 2.4 G/DL (ref 1.9–3.5)
GLUCOSE SERPL-MCNC: 123 MG/DL (ref 65–99)
HBV SURFACE AB SERPL IA-ACNC: <3.5 MIU/ML (ref 0–10)
HBV SURFACE AG SER QL: NORMAL
HCT VFR BLD AUTO: 42.4 % (ref 42–52)
HCV AB SER QL: NORMAL
HGB BLD-MCNC: 14.8 G/DL (ref 14–18)
IMM GRANULOCYTES # BLD AUTO: 0.02 K/UL (ref 0–0.11)
IMM GRANULOCYTES NFR BLD AUTO: 0.3 % (ref 0–0.9)
LYMPHOCYTES # BLD AUTO: 1.95 K/UL (ref 1–4.8)
LYMPHOCYTES NFR BLD: 32.3 % (ref 22–41)
MCH RBC QN AUTO: 30 PG (ref 27–33)
MCHC RBC AUTO-ENTMCNC: 34.9 G/DL (ref 33.7–35.3)
MCV RBC AUTO: 86 FL (ref 81.4–97.8)
MONOCYTES # BLD AUTO: 0.5 K/UL (ref 0–0.85)
MONOCYTES NFR BLD AUTO: 8.3 % (ref 0–13.4)
NEUTROPHILS # BLD AUTO: 3.42 K/UL (ref 1.82–7.42)
NEUTROPHILS NFR BLD: 56.6 % (ref 44–72)
NRBC # BLD AUTO: 0 K/UL
NRBC BLD-RTO: 0 /100 WBC
PLATELET # BLD AUTO: 266 K/UL (ref 164–446)
PMV BLD AUTO: 9.9 FL (ref 9–12.9)
POTASSIUM SERPL-SCNC: 3.8 MMOL/L (ref 3.6–5.5)
PROT SERPL-MCNC: 7 G/DL (ref 6–8.2)
RBC # BLD AUTO: 4.93 M/UL (ref 4.7–6.1)
SODIUM SERPL-SCNC: 140 MMOL/L (ref 135–145)
WBC # BLD AUTO: 6 K/UL (ref 4.8–10.8)

## 2021-08-02 PROCEDURE — 36415 COLL VENOUS BLD VENIPUNCTURE: CPT

## 2021-08-02 PROCEDURE — 86706 HEP B SURFACE ANTIBODY: CPT

## 2021-08-02 PROCEDURE — 86480 TB TEST CELL IMMUN MEASURE: CPT

## 2021-08-02 PROCEDURE — 85025 COMPLETE CBC W/AUTO DIFF WBC: CPT

## 2021-08-02 PROCEDURE — 87340 HEPATITIS B SURFACE AG IA: CPT

## 2021-08-02 PROCEDURE — 86255 FLUORESCENT ANTIBODY SCREEN: CPT

## 2021-08-02 PROCEDURE — 80053 COMPREHEN METABOLIC PANEL: CPT

## 2021-08-02 PROCEDURE — 82784 ASSAY IGA/IGD/IGG/IGM EACH: CPT

## 2021-08-02 PROCEDURE — 86803 HEPATITIS C AB TEST: CPT

## 2021-08-02 PROCEDURE — 86359 T CELLS TOTAL COUNT: CPT

## 2021-08-02 PROCEDURE — 86357 NK CELLS TOTAL COUNT: CPT

## 2021-08-02 PROCEDURE — 83516 IMMUNOASSAY NONANTIBODY: CPT

## 2021-08-02 PROCEDURE — 86360 T CELL ABSOLUTE COUNT/RATIO: CPT

## 2021-08-02 PROCEDURE — 86355 B CELLS TOTAL COUNT: CPT

## 2021-08-02 PROCEDURE — 86787 VARICELLA-ZOSTER ANTIBODY: CPT

## 2021-08-04 DIAGNOSIS — G35 MULTIPLE SCLEROSIS (HCC): Primary | ICD-10-CM

## 2021-08-04 LAB
ANNOTATION COMMENT IMP: NORMAL
CD19 CELLS NFR SPEC: 7 % (ref 6–23)
CD3 CELLS # BLD: 1361 CELLS/UL (ref 570–2400)
CD3 CELLS NFR SPEC: 83 % (ref 62–87)
CD3+CD4+ CELLS # BLD: 881 CELLS/UL (ref 430–1800)
CD3+CD4+ CELLS NFR BLD: 54 % (ref 32–64)
CD3+CD4+ CELLS/CD3+CD8+ CLL BLD: 1.8 RATIO (ref 0.8–3.9)
CD3+CD8+ CELLS # BLD: 491 CELLS/UL (ref 210–1200)
CD3+CD8+ CELLS NFR SPEC: 30 % (ref 15–46)
CD3-CD16+CD56+ CELLS # SPEC: 138 CELLS/UL (ref 78–470)
CD3-CD16+CD56+ CELLS NFR SPEC: 8 % (ref 4–26)
CELLS.CD3-CD19+ [#/VOLUME] IN BLOOD: 116 CELLS/UL (ref 91–610)
GAMMA INTERFERON BACKGROUND BLD IA-ACNC: 0.05 IU/ML
IGA SERPL-MCNC: 127 MG/DL (ref 68–408)
IGG SERPL-MCNC: 695 MG/DL (ref 768–1632)
IGM SERPL-MCNC: 77 MG/DL (ref 35–263)
M TB IFN-G BLD-IMP: NEGATIVE
M TB IFN-G CD4+ BCKGRND COR BLD-ACNC: 0 IU/ML
MITOGEN IGNF BCKGRD COR BLD-ACNC: >10 IU/ML
MOG AB SER QL CBA IFA: NORMAL
QFT TB2 - NIL TBQ2: 0 IU/ML
VZV IGG SER IA-ACNC: 1.78

## 2021-08-04 RX ORDER — 0.9 % SODIUM CHLORIDE 0.9 %
VIAL (ML) INJECTION PRN
Status: CANCELLED | OUTPATIENT
Start: 2021-09-02

## 2021-08-04 RX ORDER — ACETAMINOPHEN 325 MG/1
650 TABLET ORAL ONCE
Status: CANCELLED | OUTPATIENT
Start: 2021-09-02

## 2021-08-04 RX ORDER — 0.9 % SODIUM CHLORIDE 0.9 %
3 VIAL (ML) INJECTION PRN
Status: CANCELLED | OUTPATIENT
Start: 2021-09-02

## 2021-08-04 RX ORDER — DIPHENHYDRAMINE HCL 25 MG
25 TABLET ORAL ONCE
Status: CANCELLED | OUTPATIENT
Start: 2021-09-02 | End: 2021-09-02

## 2021-08-04 RX ORDER — 0.9 % SODIUM CHLORIDE 0.9 %
10 VIAL (ML) INJECTION PRN
Status: CANCELLED | OUTPATIENT
Start: 2021-09-02

## 2021-08-04 RX ORDER — ONDANSETRON 2 MG/ML
8 INJECTION INTRAMUSCULAR; INTRAVENOUS EVERY 4 HOURS PRN
Status: CANCELLED | OUTPATIENT
Start: 2021-09-02

## 2021-08-04 RX ORDER — HEPARIN SODIUM (PORCINE) LOCK FLUSH IV SOLN 100 UNIT/ML 100 UNIT/ML
500 SOLUTION INTRAVENOUS PRN
Status: CANCELLED | OUTPATIENT
Start: 2021-09-02

## 2021-08-04 RX ORDER — SODIUM CHLORIDE 9 MG/ML
INJECTION, SOLUTION INTRAVENOUS CONTINUOUS
Status: CANCELLED | OUTPATIENT
Start: 2021-09-02

## 2021-08-04 RX ORDER — METHYLPREDNISOLONE SODIUM SUCCINATE 125 MG/2ML
100 INJECTION, POWDER, LYOPHILIZED, FOR SOLUTION INTRAMUSCULAR; INTRAVENOUS ONCE
Status: CANCELLED | OUTPATIENT
Start: 2021-09-02

## 2021-08-04 RX ORDER — METHYLPREDNISOLONE SODIUM SUCCINATE 125 MG/2ML
125 INJECTION, POWDER, LYOPHILIZED, FOR SOLUTION INTRAMUSCULAR; INTRAVENOUS PRN
Status: CANCELLED | OUTPATIENT
Start: 2021-09-02

## 2021-08-04 RX ORDER — EPINEPHRINE 1 MG/ML(1)
0.5 AMPUL (ML) INJECTION PRN
Status: CANCELLED | OUTPATIENT
Start: 2021-09-02

## 2021-08-04 RX ORDER — DIPHENHYDRAMINE HYDROCHLORIDE 50 MG/ML
50 INJECTION INTRAMUSCULAR; INTRAVENOUS PRN
Status: CANCELLED | OUTPATIENT
Start: 2021-09-02

## 2021-08-05 LAB — AQP4 H2O CHANNEL AB SERPL IA-ACNC: <1.5 U/ML

## 2021-08-27 ENCOUNTER — TELEPHONE (OUTPATIENT)
Dept: NEUROLOGY | Facility: MEDICAL CENTER | Age: 41
End: 2021-08-27

## 2021-09-02 ENCOUNTER — NON-PROVIDER VISIT (OUTPATIENT)
Dept: MEDICAL GROUP | Facility: PHYSICIAN GROUP | Age: 41
End: 2021-09-02
Payer: COMMERCIAL

## 2021-09-02 DIAGNOSIS — Z23 NEED FOR VACCINATION: ICD-10-CM

## 2021-09-02 PROCEDURE — 90670 PCV13 VACCINE IM: CPT | Performed by: FAMILY MEDICINE

## 2021-09-02 PROCEDURE — 90471 IMMUNIZATION ADMIN: CPT | Performed by: FAMILY MEDICINE

## 2021-09-02 NOTE — PROGRESS NOTES
"Dominick Cuadra is a 40 y.o. male here for a non-provider visit for:   PREVNAR (PCV13) 1 of 1    Reason for immunization: lack of immunity demonstrated on prior labs or testing  Immunization records indicate need for vaccine: Yes, confirmed with Epic  Minimum interval has been met for this vaccine: Yes  ABN completed: Yes    VIS Dated  8/6/21 was given to patient: Yes  All IAC Questionnaire questions were answered \"No.\"    Patient tolerated injection and no adverse effects were observed or reported: Yes    Pt scheduled for next dose in series: Not Indicated  "

## 2021-09-08 NOTE — TELEPHONE ENCOUNTER
I tried to reach Dominick to discuss vaccinations.  There was no answer.  I left a voicemail message.

## 2021-09-21 ENCOUNTER — OUTPATIENT INFUSION SERVICES (OUTPATIENT)
Dept: ONCOLOGY | Facility: MEDICAL CENTER | Age: 41
End: 2021-09-21
Attending: PSYCHIATRY & NEUROLOGY
Payer: COMMERCIAL

## 2021-09-21 VITALS
OXYGEN SATURATION: 98 % | RESPIRATION RATE: 18 BRPM | BODY MASS INDEX: 27.66 KG/M2 | SYSTOLIC BLOOD PRESSURE: 114 MMHG | HEART RATE: 74 BPM | WEIGHT: 186.73 LBS | DIASTOLIC BLOOD PRESSURE: 67 MMHG | HEIGHT: 69 IN | TEMPERATURE: 98 F

## 2021-09-21 DIAGNOSIS — G35 MULTIPLE SCLEROSIS (HCC): ICD-10-CM

## 2021-09-21 PROCEDURE — 96415 CHEMO IV INFUSION ADDL HR: CPT

## 2021-09-21 PROCEDURE — 700111 HCHG RX REV CODE 636 W/ 250 OVERRIDE (IP): Performed by: PSYCHIATRY & NEUROLOGY

## 2021-09-21 PROCEDURE — 700105 HCHG RX REV CODE 258: Performed by: PSYCHIATRY & NEUROLOGY

## 2021-09-21 PROCEDURE — 96375 TX/PRO/DX INJ NEW DRUG ADDON: CPT

## 2021-09-21 PROCEDURE — 96413 CHEMO IV INFUSION 1 HR: CPT

## 2021-09-21 PROCEDURE — 700102 HCHG RX REV CODE 250 W/ 637 OVERRIDE(OP): Performed by: PSYCHIATRY & NEUROLOGY

## 2021-09-21 PROCEDURE — A9270 NON-COVERED ITEM OR SERVICE: HCPCS | Performed by: PSYCHIATRY & NEUROLOGY

## 2021-09-21 RX ORDER — EPINEPHRINE 1 MG/ML(1)
0.5 AMPUL (ML) INJECTION PRN
Status: CANCELLED | OUTPATIENT
Start: 2021-10-05

## 2021-09-21 RX ORDER — 0.9 % SODIUM CHLORIDE 0.9 %
3 VIAL (ML) INJECTION PRN
Status: CANCELLED | OUTPATIENT
Start: 2021-10-05

## 2021-09-21 RX ORDER — 0.9 % SODIUM CHLORIDE 0.9 %
10 VIAL (ML) INJECTION PRN
Status: CANCELLED | OUTPATIENT
Start: 2021-10-05

## 2021-09-21 RX ORDER — SODIUM CHLORIDE 9 MG/ML
INJECTION, SOLUTION INTRAVENOUS CONTINUOUS
Status: CANCELLED | OUTPATIENT
Start: 2021-10-05

## 2021-09-21 RX ORDER — DIPHENHYDRAMINE HYDROCHLORIDE 50 MG/ML
50 INJECTION INTRAMUSCULAR; INTRAVENOUS PRN
Status: CANCELLED | OUTPATIENT
Start: 2021-10-05

## 2021-09-21 RX ORDER — ACETAMINOPHEN 325 MG/1
650 TABLET ORAL ONCE
Status: CANCELLED | OUTPATIENT
Start: 2021-10-05

## 2021-09-21 RX ORDER — 0.9 % SODIUM CHLORIDE 0.9 %
VIAL (ML) INJECTION PRN
Status: CANCELLED | OUTPATIENT
Start: 2021-10-05

## 2021-09-21 RX ORDER — DIPHENHYDRAMINE HCL 25 MG
25 TABLET ORAL ONCE
Status: CANCELLED | OUTPATIENT
Start: 2021-10-05 | End: 2021-10-05

## 2021-09-21 RX ORDER — ONDANSETRON 2 MG/ML
8 INJECTION INTRAMUSCULAR; INTRAVENOUS EVERY 4 HOURS PRN
Status: CANCELLED | OUTPATIENT
Start: 2021-10-05

## 2021-09-21 RX ORDER — METHYLPREDNISOLONE SODIUM SUCCINATE 125 MG/2ML
100 INJECTION, POWDER, LYOPHILIZED, FOR SOLUTION INTRAMUSCULAR; INTRAVENOUS ONCE
Status: CANCELLED | OUTPATIENT
Start: 2021-10-05

## 2021-09-21 RX ORDER — METHYLPREDNISOLONE SODIUM SUCCINATE 125 MG/2ML
125 INJECTION, POWDER, LYOPHILIZED, FOR SOLUTION INTRAMUSCULAR; INTRAVENOUS PRN
Status: CANCELLED | OUTPATIENT
Start: 2021-10-05

## 2021-09-21 RX ORDER — METHYLPREDNISOLONE SODIUM SUCCINATE 125 MG/2ML
100 INJECTION, POWDER, LYOPHILIZED, FOR SOLUTION INTRAMUSCULAR; INTRAVENOUS ONCE
Status: COMPLETED | OUTPATIENT
Start: 2021-09-21 | End: 2021-09-21

## 2021-09-21 RX ORDER — DIPHENHYDRAMINE HCL 25 MG
25 TABLET ORAL ONCE
Status: COMPLETED | OUTPATIENT
Start: 2021-09-21 | End: 2021-09-21

## 2021-09-21 RX ORDER — ACETAMINOPHEN 325 MG/1
650 TABLET ORAL ONCE
Status: COMPLETED | OUTPATIENT
Start: 2021-09-21 | End: 2021-09-21

## 2021-09-21 RX ORDER — HEPARIN SODIUM (PORCINE) LOCK FLUSH IV SOLN 100 UNIT/ML 100 UNIT/ML
500 SOLUTION INTRAVENOUS PRN
Status: CANCELLED | OUTPATIENT
Start: 2021-10-05

## 2021-09-21 RX ADMIN — OCRELIZUMAB 300 MG: 300 INJECTION INTRAVENOUS at 11:07

## 2021-09-21 RX ADMIN — ACETAMINOPHEN 650 MG: 325 TABLET ORAL at 10:29

## 2021-09-21 RX ADMIN — METHYLPREDNISOLONE SODIUM SUCCINATE 100 MG: 125 INJECTION, POWDER, FOR SOLUTION INTRAMUSCULAR; INTRAVENOUS at 10:29

## 2021-09-21 RX ADMIN — DIPHENHYDRAMINE HYDROCHLORIDE 25 MG: 25 TABLET ORAL at 10:29

## 2021-09-21 ASSESSMENT — FIBROSIS 4 INDEX: FIB4 SCORE: 0.62

## 2021-09-21 NOTE — PROGRESS NOTES
Pt arrives to [facility] for day 1 of Ocrevus infusion.  Discussed plan of care and possible adverse reactions with pt.  Pt verbalizes understanding of plan.  Pt denies s/sx of infection.  Pt reports he had Pneumonia vaccine 2 weeks ago ordered by Dr. Cordova.  Ok per pharmacy to proceed with treatment today.   PIV established to L-FA.  Pre-medications given.  Ocrevus infused starting at 30 ml/hr and rate increased by 30 ml/hr every 30 minutes.  Max rate of 180ml/hr.  Ocrevus completed.  One hour observation completed without adverse reaction.  PIV flushed with NS and site removed.  Confirmed next appt in 2 weeks with pt.  Pt dc home to self care.

## 2021-10-05 ENCOUNTER — OUTPATIENT INFUSION SERVICES (OUTPATIENT)
Dept: ONCOLOGY | Facility: MEDICAL CENTER | Age: 41
End: 2021-10-05
Attending: PSYCHIATRY & NEUROLOGY
Payer: COMMERCIAL

## 2021-10-05 VITALS
OXYGEN SATURATION: 98 % | BODY MASS INDEX: 26.64 KG/M2 | HEART RATE: 73 BPM | SYSTOLIC BLOOD PRESSURE: 117 MMHG | TEMPERATURE: 98 F | WEIGHT: 186.07 LBS | HEIGHT: 70 IN | DIASTOLIC BLOOD PRESSURE: 77 MMHG | RESPIRATION RATE: 18 BRPM

## 2021-10-05 DIAGNOSIS — G35 MULTIPLE SCLEROSIS (HCC): ICD-10-CM

## 2021-10-05 PROCEDURE — 96366 THER/PROPH/DIAG IV INF ADDON: CPT

## 2021-10-05 PROCEDURE — A9270 NON-COVERED ITEM OR SERVICE: HCPCS | Performed by: PSYCHIATRY & NEUROLOGY

## 2021-10-05 PROCEDURE — 96375 TX/PRO/DX INJ NEW DRUG ADDON: CPT

## 2021-10-05 PROCEDURE — 96365 THER/PROPH/DIAG IV INF INIT: CPT

## 2021-10-05 PROCEDURE — 700105 HCHG RX REV CODE 258: Performed by: PSYCHIATRY & NEUROLOGY

## 2021-10-05 PROCEDURE — 96413 CHEMO IV INFUSION 1 HR: CPT

## 2021-10-05 PROCEDURE — 700111 HCHG RX REV CODE 636 W/ 250 OVERRIDE (IP): Performed by: PSYCHIATRY & NEUROLOGY

## 2021-10-05 PROCEDURE — 96415 CHEMO IV INFUSION ADDL HR: CPT

## 2021-10-05 PROCEDURE — 700102 HCHG RX REV CODE 250 W/ 637 OVERRIDE(OP): Performed by: PSYCHIATRY & NEUROLOGY

## 2021-10-05 RX ORDER — ONDANSETRON 2 MG/ML
8 INJECTION INTRAMUSCULAR; INTRAVENOUS EVERY 4 HOURS PRN
Status: CANCELLED | OUTPATIENT
Start: 2022-03-20

## 2021-10-05 RX ORDER — 0.9 % SODIUM CHLORIDE 0.9 %
VIAL (ML) INJECTION PRN
Status: CANCELLED | OUTPATIENT
Start: 2022-03-20

## 2021-10-05 RX ORDER — DIPHENHYDRAMINE HCL 25 MG
25 TABLET ORAL ONCE
Status: COMPLETED | OUTPATIENT
Start: 2021-10-05 | End: 2021-10-05

## 2021-10-05 RX ORDER — ACETAMINOPHEN 325 MG/1
650 TABLET ORAL ONCE
Status: COMPLETED | OUTPATIENT
Start: 2021-10-05 | End: 2021-10-05

## 2021-10-05 RX ORDER — 0.9 % SODIUM CHLORIDE 0.9 %
3 VIAL (ML) INJECTION PRN
Status: CANCELLED | OUTPATIENT
Start: 2022-03-20

## 2021-10-05 RX ORDER — DIPHENHYDRAMINE HCL 25 MG
25 TABLET ORAL ONCE
Status: CANCELLED | OUTPATIENT
Start: 2022-03-20 | End: 2022-03-20

## 2021-10-05 RX ORDER — METHYLPREDNISOLONE SODIUM SUCCINATE 125 MG/2ML
125 INJECTION, POWDER, LYOPHILIZED, FOR SOLUTION INTRAMUSCULAR; INTRAVENOUS PRN
Status: CANCELLED | OUTPATIENT
Start: 2022-03-20

## 2021-10-05 RX ORDER — 0.9 % SODIUM CHLORIDE 0.9 %
10 VIAL (ML) INJECTION PRN
Status: CANCELLED | OUTPATIENT
Start: 2022-03-20

## 2021-10-05 RX ORDER — HEPARIN SODIUM (PORCINE) LOCK FLUSH IV SOLN 100 UNIT/ML 100 UNIT/ML
500 SOLUTION INTRAVENOUS PRN
Status: CANCELLED | OUTPATIENT
Start: 2022-03-20

## 2021-10-05 RX ORDER — SODIUM CHLORIDE 9 MG/ML
INJECTION, SOLUTION INTRAVENOUS CONTINUOUS
Status: CANCELLED | OUTPATIENT
Start: 2022-03-20

## 2021-10-05 RX ORDER — DIPHENHYDRAMINE HYDROCHLORIDE 50 MG/ML
50 INJECTION INTRAMUSCULAR; INTRAVENOUS PRN
Status: CANCELLED | OUTPATIENT
Start: 2022-03-20

## 2021-10-05 RX ORDER — METHYLPREDNISOLONE SODIUM SUCCINATE 125 MG/2ML
100 INJECTION, POWDER, LYOPHILIZED, FOR SOLUTION INTRAMUSCULAR; INTRAVENOUS ONCE
Status: CANCELLED | OUTPATIENT
Start: 2022-03-20

## 2021-10-05 RX ORDER — ACETAMINOPHEN 325 MG/1
650 TABLET ORAL ONCE
Status: CANCELLED | OUTPATIENT
Start: 2022-03-20

## 2021-10-05 RX ORDER — METHYLPREDNISOLONE SODIUM SUCCINATE 125 MG/2ML
100 INJECTION, POWDER, LYOPHILIZED, FOR SOLUTION INTRAMUSCULAR; INTRAVENOUS ONCE
Status: COMPLETED | OUTPATIENT
Start: 2021-10-05 | End: 2021-10-05

## 2021-10-05 RX ORDER — EPINEPHRINE 1 MG/ML(1)
0.5 AMPUL (ML) INJECTION PRN
Status: CANCELLED | OUTPATIENT
Start: 2022-03-20

## 2021-10-05 RX ADMIN — OCRELIZUMAB 300 MG: 300 INJECTION INTRAVENOUS at 10:27

## 2021-10-05 RX ADMIN — METHYLPREDNISOLONE SODIUM SUCCINATE 100 MG: 125 INJECTION, POWDER, FOR SOLUTION INTRAMUSCULAR; INTRAVENOUS at 10:07

## 2021-10-05 RX ADMIN — DIPHENHYDRAMINE HYDROCHLORIDE 25 MG: 25 TABLET ORAL at 10:14

## 2021-10-05 RX ADMIN — ACETAMINOPHEN 650 MG: 325 TABLET ORAL at 10:07

## 2021-10-05 ASSESSMENT — PAIN DESCRIPTION - PAIN TYPE: TYPE: ACUTE PAIN

## 2021-10-05 ASSESSMENT — FIBROSIS 4 INDEX: FIB4 SCORE: 0.62

## 2021-10-05 NOTE — PROGRESS NOTES
Pt presented to infusion center for Day 15 Ocrevus. POC discussed, including estimated treatment time and hour of observation, pt agreeable. Pt denies any current s/s of infection or open wounds/sores. PIV started, brisk blood return observed. Pre-meds given as ordered. Ocrevus infused and titrated per pharmacy instructions to max rate of 180 ml/hr. Pt tolerated well with no s/s of adverse reaction. 1 hour obs completed with no s/s of adverse reaction. PIV flushed and removed, gauze and coban dressing placed. Pt has next appt, Dominick discharged home to self care.

## 2021-11-03 ENCOUNTER — OFFICE VISIT (OUTPATIENT)
Dept: NEUROLOGY | Facility: MEDICAL CENTER | Age: 41
End: 2021-11-03
Attending: PSYCHIATRY & NEUROLOGY
Payer: COMMERCIAL

## 2021-11-03 VITALS
WEIGHT: 187.61 LBS | HEIGHT: 69 IN | TEMPERATURE: 98 F | HEART RATE: 74 BPM | OXYGEN SATURATION: 97 % | BODY MASS INDEX: 27.79 KG/M2 | SYSTOLIC BLOOD PRESSURE: 116 MMHG | DIASTOLIC BLOOD PRESSURE: 80 MMHG

## 2021-11-03 DIAGNOSIS — G35 MULTIPLE SCLEROSIS (HCC): Primary | ICD-10-CM

## 2021-11-03 PROCEDURE — 99213 OFFICE O/P EST LOW 20 MIN: CPT | Performed by: PSYCHIATRY & NEUROLOGY

## 2021-11-03 PROCEDURE — 99211 OFF/OP EST MAY X REQ PHY/QHP: CPT | Performed by: PSYCHIATRY & NEUROLOGY

## 2021-11-03 ASSESSMENT — FIBROSIS 4 INDEX: FIB4 SCORE: 0.63

## 2021-11-26 ENCOUNTER — HOSPITAL ENCOUNTER (EMERGENCY)
Facility: MEDICAL CENTER | Age: 41
End: 2021-11-26
Attending: EMERGENCY MEDICINE
Payer: COMMERCIAL

## 2021-11-26 ENCOUNTER — APPOINTMENT (OUTPATIENT)
Dept: RADIOLOGY | Facility: MEDICAL CENTER | Age: 41
End: 2021-11-26
Attending: EMERGENCY MEDICINE

## 2021-11-26 VITALS
HEIGHT: 69 IN | OXYGEN SATURATION: 98 % | TEMPERATURE: 96.8 F | DIASTOLIC BLOOD PRESSURE: 83 MMHG | BODY MASS INDEX: 28.41 KG/M2 | RESPIRATION RATE: 18 BRPM | HEART RATE: 67 BPM | SYSTOLIC BLOOD PRESSURE: 131 MMHG | WEIGHT: 191.8 LBS

## 2021-11-26 DIAGNOSIS — S01.511A LIP LACERATION, INITIAL ENCOUNTER: ICD-10-CM

## 2021-11-26 DIAGNOSIS — S09.90XA CLOSED HEAD INJURY, INITIAL ENCOUNTER: ICD-10-CM
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
## 2021-11-26 PROCEDURE — 70450 CT HEAD/BRAIN W/O DYE: CPT

## 2021-11-26 PROCEDURE — 304999 HCHG REPAIR-SIMPLE/INTERMED LEVEL 1

## 2021-11-26 PROCEDURE — 99284 EMERGENCY DEPT VISIT MOD MDM: CPT

## 2021-11-26 PROCEDURE — 303747 HCHG EXTRA SUTURE

## 2021-11-26 PROCEDURE — 304217 HCHG IRRIGATION SYSTEM

## 2021-11-26 PROCEDURE — 700101 HCHG RX REV CODE 250: Performed by: EMERGENCY MEDICINE

## 2021-11-26 RX ORDER — LIDOCAINE HYDROCHLORIDE AND EPINEPHRINE 10; 10 MG/ML; UG/ML
5 INJECTION, SOLUTION INFILTRATION; PERINEURAL ONCE
Status: COMPLETED | OUTPATIENT
Start: 2021-11-26 | End: 2021-11-26

## 2021-11-26 RX ADMIN — LIDOCAINE HYDROCHLORIDE AND EPINEPHRINE 5 ML: 10; 10 INJECTION, SOLUTION INFILTRATION; PERINEURAL at 14:15

## 2021-11-26 ASSESSMENT — FIBROSIS 4 INDEX: FIB4 SCORE: 0.63

## 2021-11-26 ASSESSMENT — LIFESTYLE VARIABLES
DO YOU DRINK ALCOHOL: YES
HAVE YOU EVER FELT YOU SHOULD CUT DOWN ON YOUR DRINKING: NO

## 2021-11-26 NOTE — DISCHARGE INSTRUCTIONS
Return to the ER for vomiting, increasing headache, drainage from the lip, increased swelling or pain in the lip, or other concerns      Sutures out in 7 days    Take Tylenol/acetaminophen 650 mg every 4 hours as needed for pain    Take ibuprofen/Motrin/Advil 600 mg every 6 hours as needed for pain.  Please take this with food and water.
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
## 2022-01-28 ENCOUNTER — HOSPITAL ENCOUNTER (OUTPATIENT)
Dept: LAB | Facility: MEDICAL CENTER | Age: 42
End: 2022-01-28
Attending: FAMILY MEDICINE
Payer: COMMERCIAL

## 2022-01-28 LAB
ALBUMIN SERPL BCP-MCNC: 4.7 G/DL (ref 3.2–4.9)
ALBUMIN/GLOB SERPL: 2.2 G/DL
ALP SERPL-CCNC: 110 U/L (ref 30–99)
ALT SERPL-CCNC: 22 U/L (ref 2–50)
ANION GAP SERPL CALC-SCNC: 11 MMOL/L (ref 7–16)
AST SERPL-CCNC: 25 U/L (ref 12–45)
BASOPHILS # BLD AUTO: 0.6 % (ref 0–1.8)
BASOPHILS # BLD: 0.03 K/UL (ref 0–0.12)
BILIRUB SERPL-MCNC: 0.7 MG/DL (ref 0.1–1.5)
BUN SERPL-MCNC: 8 MG/DL (ref 8–22)
CALCIUM SERPL-MCNC: 9.1 MG/DL (ref 8.5–10.5)
CHLORIDE SERPL-SCNC: 104 MMOL/L (ref 96–112)
CHOLEST SERPL-MCNC: 166 MG/DL (ref 100–199)
CO2 SERPL-SCNC: 24 MMOL/L (ref 20–33)
CREAT SERPL-MCNC: 0.94 MG/DL (ref 0.5–1.4)
CRP SERPL HS-MCNC: 0.5 MG/L (ref 0–3)
EOSINOPHIL # BLD AUTO: 0.18 K/UL (ref 0–0.51)
EOSINOPHIL NFR BLD: 3.5 % (ref 0–6.9)
ERYTHROCYTE [DISTWIDTH] IN BLOOD BY AUTOMATED COUNT: 38.6 FL (ref 35.9–50)
EST. AVERAGE GLUCOSE BLD GHB EST-MCNC: 105 MG/DL
FASTING STATUS PATIENT QL REPORTED: NORMAL
GLOBULIN SER CALC-MCNC: 2.1 G/DL (ref 1.9–3.5)
GLUCOSE SERPL-MCNC: 101 MG/DL (ref 65–99)
HBA1C MFR BLD: 5.3 % (ref 4–5.6)
HCT VFR BLD AUTO: 48 % (ref 42–52)
HDLC SERPL-MCNC: 50 MG/DL
HGB BLD-MCNC: 16.4 G/DL (ref 14–18)
IMM GRANULOCYTES # BLD AUTO: 0.03 K/UL (ref 0–0.11)
IMM GRANULOCYTES NFR BLD AUTO: 0.6 % (ref 0–0.9)
LDLC SERPL CALC-MCNC: 100 MG/DL
LYMPHOCYTES # BLD AUTO: 1.8 K/UL (ref 1–4.8)
LYMPHOCYTES NFR BLD: 35.2 % (ref 22–41)
MCH RBC QN AUTO: 29.5 PG (ref 27–33)
MCHC RBC AUTO-ENTMCNC: 34.2 G/DL (ref 33.7–35.3)
MCV RBC AUTO: 86.3 FL (ref 81.4–97.8)
MONOCYTES # BLD AUTO: 0.53 K/UL (ref 0–0.85)
MONOCYTES NFR BLD AUTO: 10.4 % (ref 0–13.4)
NEUTROPHILS # BLD AUTO: 2.54 K/UL (ref 1.82–7.42)
NEUTROPHILS NFR BLD: 49.7 % (ref 44–72)
NRBC # BLD AUTO: 0 K/UL
NRBC BLD-RTO: 0 /100 WBC
PLATELET # BLD AUTO: 385 K/UL (ref 164–446)
PMV BLD AUTO: 9 FL (ref 9–12.9)
POTASSIUM SERPL-SCNC: 4 MMOL/L (ref 3.6–5.5)
PROT SERPL-MCNC: 6.8 G/DL (ref 6–8.2)
PSA SERPL-MCNC: 0.82 NG/ML (ref 0–4)
RBC # BLD AUTO: 5.56 M/UL (ref 4.7–6.1)
SODIUM SERPL-SCNC: 139 MMOL/L (ref 135–145)
TRIGL SERPL-MCNC: 80 MG/DL (ref 0–149)
TSH SERPL DL<=0.005 MIU/L-ACNC: 1.56 UIU/ML (ref 0.38–5.33)
WBC # BLD AUTO: 5.1 K/UL (ref 4.8–10.8)

## 2022-01-28 PROCEDURE — 83525 ASSAY OF INSULIN: CPT

## 2022-01-28 PROCEDURE — 36415 COLL VENOUS BLD VENIPUNCTURE: CPT

## 2022-01-28 PROCEDURE — 84153 ASSAY OF PSA TOTAL: CPT

## 2022-01-28 PROCEDURE — 84270 ASSAY OF SEX HORMONE GLOBUL: CPT

## 2022-01-28 PROCEDURE — 84403 ASSAY OF TOTAL TESTOSTERONE: CPT

## 2022-01-28 PROCEDURE — 80061 LIPID PANEL: CPT

## 2022-01-28 PROCEDURE — 83036 HEMOGLOBIN GLYCOSYLATED A1C: CPT

## 2022-01-28 PROCEDURE — 80053 COMPREHEN METABOLIC PANEL: CPT

## 2022-01-28 PROCEDURE — 84443 ASSAY THYROID STIM HORMONE: CPT

## 2022-01-28 PROCEDURE — 84305 ASSAY OF SOMATOMEDIN: CPT

## 2022-01-28 PROCEDURE — 85025 COMPLETE CBC W/AUTO DIFF WBC: CPT

## 2022-01-28 PROCEDURE — 86141 C-REACTIVE PROTEIN HS: CPT

## 2022-01-28 PROCEDURE — 84402 ASSAY OF FREE TESTOSTERONE: CPT

## 2022-01-29 LAB — INSULIN P FAST SERPL-ACNC: 10 UIU/ML (ref 3–25)

## 2022-01-31 LAB
IGF-I SERPL-MCNC: 145 NG/ML (ref 78–233)
IGF-I Z-SCORE SERPL: 0
SHBG SERPL-SCNC: 34 NMOL/L (ref 11–80)
TESTOST FREE MFR SERPL: 1.9 % (ref 1.6–2.9)
TESTOST FREE SERPL-MCNC: 111 PG/ML (ref 47–244)
TESTOST SERPL-MCNC: 582 NG/DL (ref 300–890)

## 2022-03-21 ENCOUNTER — OUTPATIENT INFUSION SERVICES (OUTPATIENT)
Dept: ONCOLOGY | Facility: MEDICAL CENTER | Age: 42
End: 2022-03-21
Attending: PSYCHIATRY & NEUROLOGY
Payer: COMMERCIAL

## 2022-03-21 VITALS
HEIGHT: 70 IN | WEIGHT: 205.69 LBS | SYSTOLIC BLOOD PRESSURE: 121 MMHG | RESPIRATION RATE: 18 BRPM | DIASTOLIC BLOOD PRESSURE: 84 MMHG | OXYGEN SATURATION: 96 % | TEMPERATURE: 98 F | BODY MASS INDEX: 29.45 KG/M2 | HEART RATE: 81 BPM

## 2022-03-21 DIAGNOSIS — G35 MULTIPLE SCLEROSIS (HCC): ICD-10-CM

## 2022-03-21 PROCEDURE — 96366 THER/PROPH/DIAG IV INF ADDON: CPT

## 2022-03-21 PROCEDURE — 700102 HCHG RX REV CODE 250 W/ 637 OVERRIDE(OP): Performed by: PSYCHIATRY & NEUROLOGY

## 2022-03-21 PROCEDURE — A9270 NON-COVERED ITEM OR SERVICE: HCPCS | Performed by: PSYCHIATRY & NEUROLOGY

## 2022-03-21 PROCEDURE — 700111 HCHG RX REV CODE 636 W/ 250 OVERRIDE (IP): Performed by: PSYCHIATRY & NEUROLOGY

## 2022-03-21 PROCEDURE — 96365 THER/PROPH/DIAG IV INF INIT: CPT

## 2022-03-21 PROCEDURE — 96375 TX/PRO/DX INJ NEW DRUG ADDON: CPT

## 2022-03-21 PROCEDURE — 700105 HCHG RX REV CODE 258: Performed by: PSYCHIATRY & NEUROLOGY

## 2022-03-21 RX ORDER — METHYLPREDNISOLONE SODIUM SUCCINATE 125 MG/2ML
125 INJECTION, POWDER, LYOPHILIZED, FOR SOLUTION INTRAMUSCULAR; INTRAVENOUS PRN
Status: CANCELLED | OUTPATIENT
Start: 2022-09-16

## 2022-03-21 RX ORDER — SODIUM CHLORIDE 9 MG/ML
INJECTION, SOLUTION INTRAVENOUS CONTINUOUS
Status: CANCELLED | OUTPATIENT
Start: 2022-09-16

## 2022-03-21 RX ORDER — METHYLPREDNISOLONE SODIUM SUCCINATE 125 MG/2ML
100 INJECTION, POWDER, LYOPHILIZED, FOR SOLUTION INTRAMUSCULAR; INTRAVENOUS ONCE
Status: CANCELLED | OUTPATIENT
Start: 2022-09-16

## 2022-03-21 RX ORDER — 0.9 % SODIUM CHLORIDE 0.9 %
VIAL (ML) INJECTION PRN
Status: CANCELLED | OUTPATIENT
Start: 2022-09-16

## 2022-03-21 RX ORDER — DIPHENHYDRAMINE HCL 25 MG
25 TABLET ORAL ONCE
Status: CANCELLED | OUTPATIENT
Start: 2022-09-16 | End: 2022-09-16

## 2022-03-21 RX ORDER — HEPARIN SODIUM (PORCINE) LOCK FLUSH IV SOLN 100 UNIT/ML 100 UNIT/ML
500 SOLUTION INTRAVENOUS PRN
Status: CANCELLED | OUTPATIENT
Start: 2022-09-16

## 2022-03-21 RX ORDER — ACETAMINOPHEN 325 MG/1
650 TABLET ORAL ONCE
Status: CANCELLED | OUTPATIENT
Start: 2022-09-16

## 2022-03-21 RX ORDER — METHYLPREDNISOLONE SODIUM SUCCINATE 125 MG/2ML
100 INJECTION, POWDER, LYOPHILIZED, FOR SOLUTION INTRAMUSCULAR; INTRAVENOUS ONCE
Status: COMPLETED | OUTPATIENT
Start: 2022-03-21 | End: 2022-03-21

## 2022-03-21 RX ORDER — EPINEPHRINE 1 MG/ML(1)
0.5 AMPUL (ML) INJECTION PRN
Status: CANCELLED | OUTPATIENT
Start: 2022-09-16

## 2022-03-21 RX ORDER — ONDANSETRON 2 MG/ML
8 INJECTION INTRAMUSCULAR; INTRAVENOUS EVERY 4 HOURS PRN
Status: CANCELLED | OUTPATIENT
Start: 2022-09-16

## 2022-03-21 RX ORDER — DIPHENHYDRAMINE HCL 25 MG
25 TABLET ORAL ONCE
Status: COMPLETED | OUTPATIENT
Start: 2022-03-21 | End: 2022-03-21

## 2022-03-21 RX ORDER — 0.9 % SODIUM CHLORIDE 0.9 %
10 VIAL (ML) INJECTION PRN
Status: CANCELLED | OUTPATIENT
Start: 2022-09-16

## 2022-03-21 RX ORDER — ACETAMINOPHEN 325 MG/1
650 TABLET ORAL ONCE
Status: COMPLETED | OUTPATIENT
Start: 2022-03-21 | End: 2022-03-21

## 2022-03-21 RX ORDER — 0.9 % SODIUM CHLORIDE 0.9 %
3 VIAL (ML) INJECTION PRN
Status: CANCELLED | OUTPATIENT
Start: 2022-09-16

## 2022-03-21 RX ORDER — DIPHENHYDRAMINE HYDROCHLORIDE 50 MG/ML
50 INJECTION INTRAMUSCULAR; INTRAVENOUS PRN
Status: CANCELLED | OUTPATIENT
Start: 2022-09-16

## 2022-03-21 RX ADMIN — ACETAMINOPHEN 650 MG: 325 TABLET ORAL at 09:15

## 2022-03-21 RX ADMIN — METHYLPREDNISOLONE SODIUM SUCCINATE 100 MG: 125 INJECTION, POWDER, FOR SOLUTION INTRAMUSCULAR; INTRAVENOUS at 09:15

## 2022-03-21 RX ADMIN — OCRELIZUMAB 600 MG: 300 INJECTION INTRAVENOUS at 09:46

## 2022-03-21 RX ADMIN — DIPHENHYDRAMINE HYDROCHLORIDE 25 MG: 25 TABLET ORAL at 09:15

## 2022-03-21 ASSESSMENT — FIBROSIS 4 INDEX: FIB4 SCORE: 0.6

## 2022-03-21 NOTE — PROGRESS NOTES
Dominick arrives ambulatory to [facility] for Q 6 month Ocrevus for MS.  POC discussed, including 1 hour post observation, Dominick verbalizes understanding and agreement.  Dominick denies s/s active infection, reports tolerating previous infusions well.  PIV placed, brisk blood return observed.  Pre-medications and Ocrevus administered per MAR at subsequent rate followed by 1 hour observation period.  Dominick tolerated all treatments well.  PIV flushed and removed, gauze and coban to site.  Discharged in NAD, next appointment confirmed.

## 2022-05-12 ENCOUNTER — OFFICE VISIT (OUTPATIENT)
Dept: NEUROLOGY | Facility: MEDICAL CENTER | Age: 42
End: 2022-05-12
Attending: PSYCHIATRY & NEUROLOGY
Payer: COMMERCIAL

## 2022-05-12 VITALS
HEART RATE: 83 BPM | WEIGHT: 207.89 LBS | TEMPERATURE: 98.2 F | OXYGEN SATURATION: 96 % | DIASTOLIC BLOOD PRESSURE: 88 MMHG | HEIGHT: 70 IN | BODY MASS INDEX: 29.76 KG/M2 | SYSTOLIC BLOOD PRESSURE: 124 MMHG | RESPIRATION RATE: 16 BRPM

## 2022-05-12 DIAGNOSIS — G35 MULTIPLE SCLEROSIS (HCC): Primary | ICD-10-CM

## 2022-05-12 PROCEDURE — 99213 OFFICE O/P EST LOW 20 MIN: CPT | Performed by: PSYCHIATRY & NEUROLOGY

## 2022-05-12 PROCEDURE — 99211 OFF/OP EST MAY X REQ PHY/QHP: CPT | Performed by: PSYCHIATRY & NEUROLOGY

## 2022-05-12 ASSESSMENT — PATIENT HEALTH QUESTIONNAIRE - PHQ9: CLINICAL INTERPRETATION OF PHQ2 SCORE: 0

## 2022-05-12 ASSESSMENT — FIBROSIS 4 INDEX: FIB4 SCORE: 0.6

## 2022-05-12 ASSESSMENT — PAIN SCALES - GENERAL: PAINLEVEL: NO PAIN

## 2022-05-12 NOTE — PROGRESS NOTES
[facility]  MULTIPLE SCLEROSIS & NEUROIMMUNOLOGY  FOLLOW-UP VISIT    DISEASE SUMMARY:  Principal neurologic diagnosis: MS  Diagnosis of MS: 6/7/2021  Disease History:  - 5/16/2017: episode of numbness involving the right upper- and lower extremities  - 5/13/2021: episode of numbness involving the right face which progressed to include the right hand; right foot drop; slurred speech  - 6/7/2021: admitted at [facility]; workup included MRI CNS and LP; diagnosed w/ MS; treated with IVMP x3 days followed by oral steroids  - 9/21/2021, 10/5/2021: started Ocrevus  Disease course at onset: relapsing  Current disease course: relapsing  Previous disease therapies:  - none  Current disease therapies:  - ocrelizumab: most recent dose: 3/21/2022  Symptomatic therapies:  - none  CSF (6/10/2021):  - OCBs: positive (8, 10)  - RBCs: 4  - WBCs: 2  - protein: 47  - glucose: 96:  Other Testing:  - anti-AQP4 Ab (8/2/2021): <1.5  - anti-MOG Ab (8/2/2021): <1:10  MRI head:  - 6/7/2021: simultaneous presence of enhancing and non-enhancing lesions  MRI cervical spine:  - 6/7/2021: no visible lesions  MRI thoracic spine:  - 6/7/2021: no visible lesions    CC: MS    INTERVAL HISTORY:  Dominick Cuadra is a 41 y.o. man with MS.  I last saw him in the MS Clinic on 11/3/2021.  At that time I recommended he continue ocrelizumab, and I ordered repeat imaging of the brain and cervical spine.  Today, he was unaccompanied, and he provided the following interval history:    Dominick continues on Ocrevus.  He tolerates the infusions well without side effects.  He has not noticed any new or worsened symptoms since last visit.    He started taking testosterone supplements.  He is try to cut down on sweets.  He is trying to maintain a ketogenic diet.  He does not smoke.    MEDICATIONS:  Current Outpatient Medications   Medication Sig   • sertraline (ZOLOFT) 100 MG Tab Take 1 tablet by mouth every day.   • vitamin D (VITAMIND D3) 1000 UNIT Tab Take 1  tablet by mouth every day.     MEDICAL, SOCIAL, AND FAMILY HISTORY:  There is no change in the patient's ROS or medical, social, or family histories since the previous visit on 11/3/2021.    REVIEW OF SYSTEMS:  A ROS was completed.  Pertinent positives and negatives were included in the HPI, above.  All other systems were reviewed and are negative.    PHYSICAL EXAM:  General/Medical:  - NAD    Neuro:  MENTAL STATUS: awake and alert; no deficits of speech or language; oriented to person, place, and time; affect was appropriate to situation; pleasant, cooperative    CRANIAL NERVES:    II: acuity: NT, fields: NT, pupils: NT, discs: NT    III/IV/VI: versions: grossly intact    V: facial sensation: NT    VII: facial expression: symmetric    VIII: hearing: intact to voice    IX/X: palate: NT    XI: shoulder shrug: NT    XII: tongue: NT    MOTOR:  - bulk: NT  - tone: NT  Upper Extremity Strength  (R/L)    NT   Elbow flexion NT   Elbow extension NT   Shoulder abduction NT     Lower Extremity Strength  (R/L)   Hip flexion NT   Knee extension NT   Knee flexion NT   Ankle plantarflexion NT   Ankle dorsiflexion NT     - pronator drift: NT  - abnormal movements: none    SENSATION:  - light touch: NT  - vibration (R/L, seconds): NT at the great toes  - pinprick: NT  - proprioception: NT  - Romberg: absent    COORDINATION:  - finger to nose: NT  - finger tapping: NT    REFLEXES:  Reflex Right Left   BR NT NT   Biceps NT NT   Triceps NT NT   Patellae NT NT   Achilles NT NT   Toes NT NT     GAIT:  - NT    QUANTITATIVE SCORES:  Timed 25-foot walk (sec): 3.5 on 5/12/2022 (3.9 on 7/29/2021).  Assistive device: none    REVIEW OF IMAGING STUDIES:  No additional imaging since the last visit.    REVIEW OF LABORATORY STUDIES:  Reviewed.    ASSESSMENT:  Dominick Cuadra is a 41 y.o. man with MS.  He remains clinically stable on Ocrevus, which he has tolerated well so far.  Plan to continue with this line of treatment.  Plan for repeat  imaging (which will serve as the new baseline on therapy).    PLAN:  Multiple Sclerosis:  - continue Ocrevus  - repeat MRI brain and cervical spine w/o and w/ contrast (will serve as the new baseline on Ocrevus)    Follow-Up:  - Return in about 6 months (around 11/12/2022).   - w/ Brittany Cote    Signed: Bill Cordova M.D.

## 2022-05-19 ENCOUNTER — HOSPITAL ENCOUNTER (OUTPATIENT)
Dept: LAB | Facility: MEDICAL CENTER | Age: 42
End: 2022-05-19
Attending: FAMILY MEDICINE
Payer: COMMERCIAL

## 2022-05-19 LAB
ALBUMIN SERPL BCP-MCNC: 4.3 G/DL (ref 3.2–4.9)
ALBUMIN/GLOB SERPL: 2.4 G/DL
ALP SERPL-CCNC: 101 U/L (ref 30–99)
ALT SERPL-CCNC: 22 U/L (ref 2–50)
ANION GAP SERPL CALC-SCNC: 9 MMOL/L (ref 7–16)
AST SERPL-CCNC: 23 U/L (ref 12–45)
BASOPHILS # BLD AUTO: 0.6 % (ref 0–1.8)
BASOPHILS # BLD: 0.04 K/UL (ref 0–0.12)
BILIRUB SERPL-MCNC: 0.5 MG/DL (ref 0.1–1.5)
BUN SERPL-MCNC: 12 MG/DL (ref 8–22)
CALCIUM SERPL-MCNC: 8.7 MG/DL (ref 8.5–10.5)
CHLORIDE SERPL-SCNC: 106 MMOL/L (ref 96–112)
CHOLEST SERPL-MCNC: 117 MG/DL (ref 100–199)
CO2 SERPL-SCNC: 25 MMOL/L (ref 20–33)
CREAT SERPL-MCNC: 0.93 MG/DL (ref 0.5–1.4)
EOSINOPHIL # BLD AUTO: 0.19 K/UL (ref 0–0.51)
EOSINOPHIL NFR BLD: 3 % (ref 0–6.9)
ERYTHROCYTE [DISTWIDTH] IN BLOOD BY AUTOMATED COUNT: 38.5 FL (ref 35.9–50)
ESTRADIOL SERPL-MCNC: 67.2 PG/ML
FASTING STATUS PATIENT QL REPORTED: NORMAL
GFR SERPLBLD CREATININE-BSD FMLA CKD-EPI: 104 ML/MIN/1.73 M 2
GLOBULIN SER CALC-MCNC: 1.8 G/DL (ref 1.9–3.5)
GLUCOSE SERPL-MCNC: 83 MG/DL (ref 65–99)
HCT VFR BLD AUTO: 49.4 % (ref 42–52)
HDLC SERPL-MCNC: 34 MG/DL
HGB BLD-MCNC: 16.7 G/DL (ref 14–18)
IMM GRANULOCYTES # BLD AUTO: 0.03 K/UL (ref 0–0.11)
IMM GRANULOCYTES NFR BLD AUTO: 0.5 % (ref 0–0.9)
LDLC SERPL CALC-MCNC: 76 MG/DL
LYMPHOCYTES # BLD AUTO: 1.94 K/UL (ref 1–4.8)
LYMPHOCYTES NFR BLD: 30.2 % (ref 22–41)
MCH RBC QN AUTO: 29.3 PG (ref 27–33)
MCHC RBC AUTO-ENTMCNC: 33.8 G/DL (ref 33.7–35.3)
MCV RBC AUTO: 86.7 FL (ref 81.4–97.8)
MONOCYTES # BLD AUTO: 0.72 K/UL (ref 0–0.85)
MONOCYTES NFR BLD AUTO: 11.2 % (ref 0–13.4)
NEUTROPHILS # BLD AUTO: 3.5 K/UL (ref 1.82–7.42)
NEUTROPHILS NFR BLD: 54.5 % (ref 44–72)
NRBC # BLD AUTO: 0 K/UL
NRBC BLD-RTO: 0 /100 WBC
PLATELET # BLD AUTO: 348 K/UL (ref 164–446)
PMV BLD AUTO: 9.3 FL (ref 9–12.9)
POTASSIUM SERPL-SCNC: 4.4 MMOL/L (ref 3.6–5.5)
PROT SERPL-MCNC: 6.1 G/DL (ref 6–8.2)
RBC # BLD AUTO: 5.7 M/UL (ref 4.7–6.1)
SODIUM SERPL-SCNC: 140 MMOL/L (ref 135–145)
TRIGL SERPL-MCNC: 37 MG/DL (ref 0–149)
WBC # BLD AUTO: 6.4 K/UL (ref 4.8–10.8)

## 2022-05-19 PROCEDURE — 84402 ASSAY OF FREE TESTOSTERONE: CPT

## 2022-05-19 PROCEDURE — 85025 COMPLETE CBC W/AUTO DIFF WBC: CPT

## 2022-05-19 PROCEDURE — 84270 ASSAY OF SEX HORMONE GLOBUL: CPT

## 2022-05-19 PROCEDURE — 80053 COMPREHEN METABOLIC PANEL: CPT

## 2022-05-19 PROCEDURE — 80061 LIPID PANEL: CPT

## 2022-05-19 PROCEDURE — 84305 ASSAY OF SOMATOMEDIN: CPT

## 2022-05-19 PROCEDURE — 84403 ASSAY OF TOTAL TESTOSTERONE: CPT

## 2022-05-19 PROCEDURE — 82670 ASSAY OF TOTAL ESTRADIOL: CPT

## 2022-05-19 PROCEDURE — 36415 COLL VENOUS BLD VENIPUNCTURE: CPT

## 2022-05-21 LAB
SHBG SERPL-SCNC: 25 NMOL/L (ref 17–56)
TESTOST FREE MFR SERPL: ABNORMAL % (ref 1.6–2.9)
TESTOST FREE SERPL-MCNC: ABNORMAL PG/ML (ref 47–244)
TESTOST SERPL-MCNC: >1500 NG/DL (ref 300–890)

## 2022-05-22 LAB
IGF-I SERPL-MCNC: 161 NG/ML (ref 78–233)
IGF-I Z-SCORE SERPL: 0.4

## 2022-07-05 ENCOUNTER — APPOINTMENT (OUTPATIENT)
Dept: RADIOLOGY | Facility: MEDICAL CENTER | Age: 42
End: 2022-07-05
Attending: PSYCHIATRY & NEUROLOGY
Payer: COMMERCIAL

## 2022-07-05 DIAGNOSIS — G35 MULTIPLE SCLEROSIS (HCC): ICD-10-CM

## 2022-07-05 PROCEDURE — 700117 HCHG RX CONTRAST REV CODE 255: Performed by: PSYCHIATRY & NEUROLOGY

## 2022-07-05 PROCEDURE — A9576 INJ PROHANCE MULTIPACK: HCPCS | Performed by: PSYCHIATRY & NEUROLOGY

## 2022-07-05 PROCEDURE — 72156 MRI NECK SPINE W/O & W/DYE: CPT

## 2022-07-05 PROCEDURE — 70553 MRI BRAIN STEM W/O & W/DYE: CPT

## 2022-07-05 RX ADMIN — GADOTERIDOL 15 ML: 279.3 INJECTION, SOLUTION INTRAVENOUS at 14:21

## 2022-07-11 ENCOUNTER — HOSPITAL ENCOUNTER (OUTPATIENT)
Dept: RADIOLOGY | Facility: MEDICAL CENTER | Age: 42
End: 2022-07-11
Attending: FAMILY MEDICINE
Payer: COMMERCIAL

## 2022-07-11 DIAGNOSIS — M54.2 CERVICALGIA: ICD-10-CM

## 2022-07-11 DIAGNOSIS — M54.51 VERTEBROGENIC LOW BACK PAIN: ICD-10-CM

## 2022-07-11 PROCEDURE — 72040 X-RAY EXAM NECK SPINE 2-3 VW: CPT

## 2022-07-11 PROCEDURE — 72100 X-RAY EXAM L-S SPINE 2/3 VWS: CPT

## 2022-07-26 DIAGNOSIS — G35 MULTIPLE SCLEROSIS (HCC): Primary | ICD-10-CM

## 2022-09-21 ENCOUNTER — OUTPATIENT INFUSION SERVICES (OUTPATIENT)
Dept: ONCOLOGY | Facility: MEDICAL CENTER | Age: 42
End: 2022-09-21
Attending: PSYCHIATRY & NEUROLOGY
Payer: COMMERCIAL

## 2022-09-21 VITALS
BODY MASS INDEX: 28.51 KG/M2 | SYSTOLIC BLOOD PRESSURE: 124 MMHG | HEART RATE: 72 BPM | RESPIRATION RATE: 18 BRPM | OXYGEN SATURATION: 97 % | DIASTOLIC BLOOD PRESSURE: 82 MMHG | TEMPERATURE: 97.9 F | WEIGHT: 192.46 LBS | HEIGHT: 69 IN

## 2022-09-21 DIAGNOSIS — G35 MULTIPLE SCLEROSIS (HCC): ICD-10-CM

## 2022-09-21 PROCEDURE — A9270 NON-COVERED ITEM OR SERVICE: HCPCS | Performed by: PSYCHIATRY & NEUROLOGY

## 2022-09-21 PROCEDURE — 96413 CHEMO IV INFUSION 1 HR: CPT

## 2022-09-21 PROCEDURE — 700111 HCHG RX REV CODE 636 W/ 250 OVERRIDE (IP): Performed by: PSYCHIATRY & NEUROLOGY

## 2022-09-21 PROCEDURE — 700105 HCHG RX REV CODE 258: Performed by: PSYCHIATRY & NEUROLOGY

## 2022-09-21 PROCEDURE — 96375 TX/PRO/DX INJ NEW DRUG ADDON: CPT

## 2022-09-21 PROCEDURE — 700102 HCHG RX REV CODE 250 W/ 637 OVERRIDE(OP): Performed by: PSYCHIATRY & NEUROLOGY

## 2022-09-21 PROCEDURE — 96415 CHEMO IV INFUSION ADDL HR: CPT

## 2022-09-21 RX ORDER — ACETAMINOPHEN 325 MG/1
650 TABLET ORAL ONCE
Status: CANCELLED | OUTPATIENT
Start: 2023-03-16

## 2022-09-21 RX ORDER — 0.9 % SODIUM CHLORIDE 0.9 %
VIAL (ML) INJECTION PRN
Status: CANCELLED | OUTPATIENT
Start: 2023-03-16

## 2022-09-21 RX ORDER — ACETAMINOPHEN 325 MG/1
650 TABLET ORAL ONCE
Status: DISCONTINUED | OUTPATIENT
Start: 2022-09-21 | End: 2022-09-21 | Stop reason: HOSPADM

## 2022-09-21 RX ORDER — DIPHENHYDRAMINE HCL 25 MG
25 TABLET ORAL ONCE
Status: CANCELLED | OUTPATIENT
Start: 2023-03-16 | End: 2023-03-16

## 2022-09-21 RX ORDER — HEPARIN SODIUM (PORCINE) LOCK FLUSH IV SOLN 100 UNIT/ML 100 UNIT/ML
500 SOLUTION INTRAVENOUS PRN
Status: CANCELLED | OUTPATIENT
Start: 2023-03-16

## 2022-09-21 RX ORDER — 0.9 % SODIUM CHLORIDE 0.9 %
3 VIAL (ML) INJECTION PRN
Status: CANCELLED | OUTPATIENT
Start: 2023-03-16

## 2022-09-21 RX ORDER — EPINEPHRINE 1 MG/ML(1)
0.5 AMPUL (ML) INJECTION PRN
Status: CANCELLED | OUTPATIENT
Start: 2023-03-16

## 2022-09-21 RX ORDER — METHYLPREDNISOLONE SODIUM SUCCINATE 125 MG/2ML
125 INJECTION, POWDER, LYOPHILIZED, FOR SOLUTION INTRAMUSCULAR; INTRAVENOUS PRN
Status: CANCELLED | OUTPATIENT
Start: 2023-03-16

## 2022-09-21 RX ORDER — METHOCARBAMOL 750 MG/1
750 TABLET, FILM COATED ORAL 3 TIMES DAILY PRN
COMMUNITY
Start: 2022-07-07

## 2022-09-21 RX ORDER — METHYLPREDNISOLONE SODIUM SUCCINATE 125 MG/2ML
100 INJECTION, POWDER, LYOPHILIZED, FOR SOLUTION INTRAMUSCULAR; INTRAVENOUS ONCE
Status: CANCELLED | OUTPATIENT
Start: 2023-03-16

## 2022-09-21 RX ORDER — SODIUM CHLORIDE 9 MG/ML
INJECTION, SOLUTION INTRAVENOUS CONTINUOUS
Status: CANCELLED | OUTPATIENT
Start: 2023-03-16

## 2022-09-21 RX ORDER — DIPHENHYDRAMINE HCL 25 MG
25 TABLET ORAL ONCE
Status: COMPLETED | OUTPATIENT
Start: 2022-09-21 | End: 2022-09-21

## 2022-09-21 RX ORDER — METHYLPREDNISOLONE SODIUM SUCCINATE 125 MG/2ML
100 INJECTION, POWDER, LYOPHILIZED, FOR SOLUTION INTRAMUSCULAR; INTRAVENOUS ONCE
Status: COMPLETED | OUTPATIENT
Start: 2022-09-21 | End: 2022-09-21

## 2022-09-21 RX ORDER — 0.9 % SODIUM CHLORIDE 0.9 %
10 VIAL (ML) INJECTION PRN
Status: CANCELLED | OUTPATIENT
Start: 2023-03-16

## 2022-09-21 RX ORDER — BENZONATATE 100 MG/1
CAPSULE ORAL
COMMUNITY
Start: 2022-09-14

## 2022-09-21 RX ORDER — ONDANSETRON 2 MG/ML
8 INJECTION INTRAMUSCULAR; INTRAVENOUS EVERY 4 HOURS PRN
Status: CANCELLED | OUTPATIENT
Start: 2023-03-16

## 2022-09-21 RX ORDER — OXYCODONE HYDROCHLORIDE AND ACETAMINOPHEN 5; 325 MG/1; MG/1
TABLET ORAL
COMMUNITY
Start: 2022-08-17

## 2022-09-21 RX ORDER — DIPHENHYDRAMINE HYDROCHLORIDE 50 MG/ML
50 INJECTION INTRAMUSCULAR; INTRAVENOUS PRN
Status: CANCELLED | OUTPATIENT
Start: 2023-03-16

## 2022-09-21 RX ADMIN — DIPHENHYDRAMINE HYDROCHLORIDE 25 MG: 25 TABLET ORAL at 10:53

## 2022-09-21 RX ADMIN — OCRELIZUMAB 600 MG: 300 INJECTION INTRAVENOUS at 11:18

## 2022-09-21 RX ADMIN — METHYLPREDNISOLONE SODIUM SUCCINATE 100 MG: 125 INJECTION, POWDER, FOR SOLUTION INTRAMUSCULAR; INTRAVENOUS at 10:54

## 2022-09-21 ASSESSMENT — FIBROSIS 4 INDEX: FIB4 SCORE: 0.61

## 2022-09-21 NOTE — PROGRESS NOTES
Pt presents to [facility] for ocrelizumab. Established PIV in R FA; brisk blood return observed and pt tolerated well. Pre meds administered and ocrelizumab infused with filter per titration and with 1 hour of observation with no s/s of adverse reactions. PIV flushed and brisk blood return observed before removing; gauze/coband dressing applied. Next appointment confirmed and education provided. Pt discharged to self care with all personal belongings and in NAD.

## 2022-10-17 ENCOUNTER — HOSPITAL ENCOUNTER (OUTPATIENT)
Dept: LAB | Facility: MEDICAL CENTER | Age: 42
End: 2022-10-17
Attending: FAMILY MEDICINE
Payer: COMMERCIAL

## 2022-10-17 LAB
BASOPHILS # BLD AUTO: 0.6 % (ref 0–1.8)
BASOPHILS # BLD: 0.05 K/UL (ref 0–0.12)
EOSINOPHIL # BLD AUTO: 0.18 K/UL (ref 0–0.51)
EOSINOPHIL NFR BLD: 2.3 % (ref 0–6.9)
ERYTHROCYTE [DISTWIDTH] IN BLOOD BY AUTOMATED COUNT: 39.8 FL (ref 35.9–50)
ESTRADIOL SERPL-MCNC: 61.5 PG/ML
HCT VFR BLD AUTO: 47.8 % (ref 42–52)
HGB BLD-MCNC: 16.3 G/DL (ref 14–18)
IMM GRANULOCYTES # BLD AUTO: 0.04 K/UL (ref 0–0.11)
IMM GRANULOCYTES NFR BLD AUTO: 0.5 % (ref 0–0.9)
LYMPHOCYTES # BLD AUTO: 2.11 K/UL (ref 1–4.8)
LYMPHOCYTES NFR BLD: 27.4 % (ref 22–41)
MCH RBC QN AUTO: 30 PG (ref 27–33)
MCHC RBC AUTO-ENTMCNC: 34.1 G/DL (ref 33.7–35.3)
MCV RBC AUTO: 87.9 FL (ref 81.4–97.8)
MONOCYTES # BLD AUTO: 0.78 K/UL (ref 0–0.85)
MONOCYTES NFR BLD AUTO: 10.1 % (ref 0–13.4)
NEUTROPHILS # BLD AUTO: 4.54 K/UL (ref 1.82–7.42)
NEUTROPHILS NFR BLD: 59.1 % (ref 44–72)
NRBC # BLD AUTO: 0 K/UL
NRBC BLD-RTO: 0 /100 WBC
PLATELET # BLD AUTO: 362 K/UL (ref 164–446)
PMV BLD AUTO: 9.2 FL (ref 9–12.9)
PROLACTIN SERPL-MCNC: 7.1 NG/ML (ref 2.1–17.7)
RBC # BLD AUTO: 5.44 M/UL (ref 4.7–6.1)
WBC # BLD AUTO: 7.7 K/UL (ref 4.8–10.8)

## 2022-10-17 PROCEDURE — 84146 ASSAY OF PROLACTIN: CPT

## 2022-10-17 PROCEDURE — 84270 ASSAY OF SEX HORMONE GLOBUL: CPT

## 2022-10-17 PROCEDURE — 36415 COLL VENOUS BLD VENIPUNCTURE: CPT

## 2022-10-17 PROCEDURE — 85025 COMPLETE CBC W/AUTO DIFF WBC: CPT

## 2022-10-17 PROCEDURE — 82670 ASSAY OF TOTAL ESTRADIOL: CPT

## 2022-10-17 PROCEDURE — 84403 ASSAY OF TOTAL TESTOSTERONE: CPT

## 2022-10-17 PROCEDURE — 84402 ASSAY OF FREE TESTOSTERONE: CPT

## 2022-10-19 LAB
SHBG SERPL-SCNC: 23 NMOL/L (ref 17–56)
TESTOST FREE MFR SERPL: ABNORMAL % (ref 1.6–2.9)
TESTOST FREE SERPL-MCNC: ABNORMAL PG/ML (ref 47–244)
TESTOST SERPL-MCNC: >1500 NG/DL (ref 300–890)

## 2022-11-15 ENCOUNTER — TELEPHONE (OUTPATIENT)
Dept: NEUROLOGY | Facility: MEDICAL CENTER | Age: 42
End: 2022-11-15
Payer: COMMERCIAL

## 2023-01-13 ENCOUNTER — TELEPHONE (OUTPATIENT)
Dept: NEUROLOGY | Facility: MEDICAL CENTER | Age: 43
End: 2023-01-13
Payer: COMMERCIAL

## 2023-01-13 NOTE — TELEPHONE ENCOUNTER
NEUROLOGY PATIENT PRE-VISIT PLANNING     Patient was NOT contacted to complete PVP.  Note: Patient will not be contacted if there is no indication to call.     Patient Appointment is scheduled as: Established Patient     Is visit type and length scheduled correctly? Yes    [facility] Patient Demographics? Yes    3.   Is referral attached to visit? Yes    4. Were records received from referring provider? Yes    4. Patient was NOT contacted to have someone accompany them to visit.     5. Is this appointment scheduled as a Hospital Follow-Up?  No    6. Does the patient require any pre procedure or post procedure follow up? No    7. If any orders were placed at last visit or intended to be done for this visit do we have Results/Consult Notes? Yes  Labs - Labs were not ordered at last office visit.  Imaging - Imaging ordered, completed and results are in chart.  Referrals - No referrals were ordered at last office visit.      8. If patient appointment is for Botox - is order pended for provider? N/A

## 2023-01-18 ENCOUNTER — OFFICE VISIT (OUTPATIENT)
Dept: NEUROLOGY | Facility: MEDICAL CENTER | Age: 43
End: 2023-01-18
Attending: PSYCHIATRY & NEUROLOGY
Payer: COMMERCIAL

## 2023-01-18 VITALS
TEMPERATURE: 98.8 F | DIASTOLIC BLOOD PRESSURE: 80 MMHG | BODY MASS INDEX: 29.64 KG/M2 | RESPIRATION RATE: 17 BRPM | SYSTOLIC BLOOD PRESSURE: 118 MMHG | WEIGHT: 202.38 LBS | HEART RATE: 68 BPM | OXYGEN SATURATION: 98 %

## 2023-01-18 DIAGNOSIS — G35 MULTIPLE SCLEROSIS (HCC): Primary | ICD-10-CM

## 2023-01-18 DIAGNOSIS — Z79.899 IMMUNOSUPPRESSION DUE TO DRUG THERAPY (HCC): ICD-10-CM

## 2023-01-18 DIAGNOSIS — D84.821 IMMUNOSUPPRESSION DUE TO DRUG THERAPY (HCC): ICD-10-CM

## 2023-01-18 PROCEDURE — 99211 OFF/OP EST MAY X REQ PHY/QHP: CPT | Performed by: PSYCHIATRY & NEUROLOGY

## 2023-01-18 PROCEDURE — 99214 OFFICE O/P EST MOD 30 MIN: CPT | Performed by: PSYCHIATRY & NEUROLOGY

## 2023-01-18 RX ORDER — PNEUMOCOCCAL VACCINE POLYVALENT 25; 25; 25; 25; 25; 25; 25; 25; 25; 25; 25; 25; 25; 25; 25; 25; 25; 25; 25; 25; 25; 25; 25 UG/.5ML; UG/.5ML; UG/.5ML; UG/.5ML; UG/.5ML; UG/.5ML; UG/.5ML; UG/.5ML; UG/.5ML; UG/.5ML; UG/.5ML; UG/.5ML; UG/.5ML; UG/.5ML; UG/.5ML; UG/.5ML; UG/.5ML; UG/.5ML; UG/.5ML; UG/.5ML; UG/.5ML; UG/.5ML; UG/.5ML
0.5 INJECTION, SOLUTION INTRAMUSCULAR; SUBCUTANEOUS ONCE
Qty: 0.5 ML | Refills: 0 | Status: SHIPPED | OUTPATIENT
Start: 2023-01-18 | End: 2023-01-18

## 2023-01-18 ASSESSMENT — FIBROSIS 4 INDEX: FIB4 SCORE: 0.6

## 2023-01-18 ASSESSMENT — PATIENT HEALTH QUESTIONNAIRE - PHQ9: CLINICAL INTERPRETATION OF PHQ2 SCORE: 0

## 2023-01-18 NOTE — PROGRESS NOTES
[facility]  MULTIPLE SCLEROSIS & NEUROIMMUNOLOGY  FOLLOW-UP VISIT    DISEASE SUMMARY:  Principal neurologic diagnosis: MS  Diagnosis of MS: 6/7/2021  Disease History:  - 5/16/2017: episode of numbness involving the right upper- and lower extremities  - 5/13/2021: episode of numbness involving the right face which progressed to include the right hand; right foot drop; slurred speech  - 6/7/2021: admitted at [facility]; workup included MRI CNS and LP; diagnosed w/ MS; treated with IVMP x3 days followed by oral steroids  - 9/21/2021, 10/5/2021: started Ocrevus  Disease course at onset: relapsing  Current disease course: relapsing  Previous disease therapies:  - none  Current disease therapies:  - ocrelizumab: most recent dose: 9/21/2022  Symptomatic therapies:  - none  CSF (6/10/2021):  - OCBs: positive (8, 10)  - RBCs: 4  - WBCs: 2  - protein: 47  - glucose: 96:  Other Testing:  - anti-AQP4 Ab (8/2/2021): <1.5  - anti-MOG Ab (8/2/2021): <1:10  MRI head:  - 7/5/2022: stable, no abnormal enhancement  - 6/7/2021: simultaneous presence of enhancing and non-enhancing lesions  MRI cervical spine:  - 7/5/2022: stable, no abnormal enhancement  - 6/7/2021: no visible lesions  MRI thoracic spine:  - 6/7/2021: no visible lesions    CC: MS    INTERVAL HISTORY:  Dominick Cuadra is a 42 y.o. man with MS.  I last saw him in the MS Clinic on 5/12/2022.  At that time I recommended he continue ocrelizumab, and I ordered repeat imaging of the brain and cervical spine.  Today, he was unaccompanied, and he provided the following interval history:    Dominick continues on Ocrevus.  He tolerates the infusions well without side effects.  He has not noticed any new or worsened symptoms since last visit.    MEDICATIONS:  Current Outpatient Medications   Medication Sig    pneumococcal vaccine (PNEUMOVAX 23) 25 MCG/0.5ML Injection Inject 0.5 mL into the shoulder, thigh, or buttocks one time for 1 dose.    oxyCODONE-acetaminophen (PERCOCET)  5-325 MG Tab TAKE 1 TO 2 TABLETS BY MOUTH THREE TIMES DAILY FOR 7 DAYS AS NEEDED    sertraline (ZOLOFT) 50 MG Tab Take 50 mg by mouth every day.    vitamin D (VITAMIND D3) 1000 UNIT Tab Take 1 tablet by mouth every day.    benzonatate (TESSALON) 100 MG Cap TAKE 1 TO 2 CAPSULES BY MOUTH THREE TIMES DAILY AS NEEDED FOR COUGH (Patient not taking: Reported on 1/18/2023)    methocarbamol (ROBAXIN) 750 MG Tab Take 750 mg by mouth 3 times a day as needed. (Patient not taking: Reported on 1/18/2023)     MEDICAL, SOCIAL, AND FAMILY HISTORY:  There is no change in the patient's ROS or medical, social, or family histories since the previous visit on 7/5/2022.    REVIEW OF SYSTEMS:  A ROS was completed.  Pertinent positives and negatives were included in the HPI, above.  All other systems were reviewed and are negative.    PHYSICAL EXAM:  General/Medical:  - NAD    Neuro:  MENTAL STATUS: awake and alert; no deficits of speech or language; oriented to person, place, and time; affect was appropriate to situation; pleasant, cooperative    CRANIAL NERVES:    II: acuity: NT, fields: NT, pupils: NT, discs: NT    III/IV/VI: versions: grossly intact    V: facial sensation: NT    VII: facial expression: symmetric    VIII: hearing: intact to voice    IX/X: palate: NT    XI: shoulder shrug: NT    XII: tongue: NT    MOTOR:  - bulk: NT  - tone: NT  Upper Extremity Strength  (R/L)    NT   Elbow flexion NT   Elbow extension NT   Shoulder abduction NT     Lower Extremity Strength  (R/L)   Hip flexion NT   Knee extension NT   Knee flexion NT   Ankle plantarflexion NT   Ankle dorsiflexion NT     - pronator drift: NT  - abnormal movements: none    SENSATION:  - light touch: NT  - vibration (R/L, seconds): NT at the great toes  - pinprick: NT  - proprioception: NT  - Romberg: absent    COORDINATION:  - finger to nose: NT  - finger tapping: NT    REFLEXES:  Reflex Right Left   BR NT NT   Biceps NT NT   Triceps NT NT   Patellae NT NT   Achilles  NT NT   Toes NT NT     GAIT:  - NT    QUANTITATIVE SCORES:  Timed 25-foot walk (sec): 3.4 on 1/18/2022 (3.5 on 5/12/2022, 3.9 on 7/29/2021).  Assistive device: none    REVIEW OF IMAGING STUDIES:  No additional imaging since the last visit.    REVIEW OF LABORATORY STUDIES:  Reviewed.    ASSESSMENT:  Dominick Cuadra is a 42 y.o. man with MS.  He remains clinically stable on Ocrevus, which he has tolerated well so far.  Plan to continue with this line of treatment.    PLAN:  Multiple Sclerosis:  - continue Ocrevus  - Pneumovax 23  - referral to dermatology for yearly cancer screenings  - continue testicular self exams  - immunoglobulin levels  - lymphocyte subsets    Follow-Up:  - Return in about 6 months (around 7/18/2023).     Signed: Bill Cordova M.D.

## 2023-03-21 ENCOUNTER — OUTPATIENT INFUSION SERVICES (OUTPATIENT)
Dept: ONCOLOGY | Facility: MEDICAL CENTER | Age: 43
End: 2023-03-21
Attending: PSYCHIATRY & NEUROLOGY
Payer: COMMERCIAL

## 2023-03-21 VITALS
DIASTOLIC BLOOD PRESSURE: 91 MMHG | BODY MASS INDEX: 29.16 KG/M2 | WEIGHT: 203.71 LBS | RESPIRATION RATE: 18 BRPM | OXYGEN SATURATION: 98 % | TEMPERATURE: 98.2 F | HEIGHT: 70 IN | SYSTOLIC BLOOD PRESSURE: 113 MMHG | HEART RATE: 64 BPM

## 2023-03-21 DIAGNOSIS — G35 MULTIPLE SCLEROSIS (HCC): ICD-10-CM

## 2023-03-21 PROCEDURE — 96375 TX/PRO/DX INJ NEW DRUG ADDON: CPT

## 2023-03-21 PROCEDURE — 700111 HCHG RX REV CODE 636 W/ 250 OVERRIDE (IP): Performed by: PSYCHIATRY & NEUROLOGY

## 2023-03-21 PROCEDURE — 96415 CHEMO IV INFUSION ADDL HR: CPT

## 2023-03-21 PROCEDURE — 96366 THER/PROPH/DIAG IV INF ADDON: CPT

## 2023-03-21 PROCEDURE — 700105 HCHG RX REV CODE 258: Performed by: PSYCHIATRY & NEUROLOGY

## 2023-03-21 PROCEDURE — 96365 THER/PROPH/DIAG IV INF INIT: CPT

## 2023-03-21 PROCEDURE — 700102 HCHG RX REV CODE 250 W/ 637 OVERRIDE(OP): Performed by: PSYCHIATRY & NEUROLOGY

## 2023-03-21 PROCEDURE — 96413 CHEMO IV INFUSION 1 HR: CPT

## 2023-03-21 PROCEDURE — A9270 NON-COVERED ITEM OR SERVICE: HCPCS | Performed by: PSYCHIATRY & NEUROLOGY

## 2023-03-21 RX ORDER — ONDANSETRON 2 MG/ML
8 INJECTION INTRAMUSCULAR; INTRAVENOUS EVERY 4 HOURS PRN
Status: CANCELLED | OUTPATIENT
Start: 2023-09-16

## 2023-03-21 RX ORDER — METHYLPREDNISOLONE SODIUM SUCCINATE 125 MG/2ML
100 INJECTION, POWDER, LYOPHILIZED, FOR SOLUTION INTRAMUSCULAR; INTRAVENOUS ONCE
Status: COMPLETED | OUTPATIENT
Start: 2023-03-21 | End: 2023-03-21

## 2023-03-21 RX ORDER — 0.9 % SODIUM CHLORIDE 0.9 %
3 VIAL (ML) INJECTION PRN
Status: CANCELLED | OUTPATIENT
Start: 2023-09-16

## 2023-03-21 RX ORDER — 0.9 % SODIUM CHLORIDE 0.9 %
VIAL (ML) INJECTION PRN
Status: CANCELLED | OUTPATIENT
Start: 2023-09-16

## 2023-03-21 RX ORDER — DIPHENHYDRAMINE HCL 25 MG
25 TABLET ORAL ONCE
Status: CANCELLED | OUTPATIENT
Start: 2023-09-16 | End: 2023-09-16

## 2023-03-21 RX ORDER — ACETAMINOPHEN 325 MG/1
650 TABLET ORAL ONCE
Status: COMPLETED | OUTPATIENT
Start: 2023-03-21 | End: 2023-03-21

## 2023-03-21 RX ORDER — DIPHENHYDRAMINE HYDROCHLORIDE 50 MG/ML
50 INJECTION INTRAMUSCULAR; INTRAVENOUS PRN
Status: CANCELLED | OUTPATIENT
Start: 2023-09-16

## 2023-03-21 RX ORDER — SODIUM CHLORIDE 9 MG/ML
INJECTION, SOLUTION INTRAVENOUS CONTINUOUS
Status: CANCELLED | OUTPATIENT
Start: 2023-09-16

## 2023-03-21 RX ORDER — EPINEPHRINE 1 MG/ML(1)
0.5 AMPUL (ML) INJECTION PRN
Status: CANCELLED | OUTPATIENT
Start: 2023-09-16

## 2023-03-21 RX ORDER — 0.9 % SODIUM CHLORIDE 0.9 %
10 VIAL (ML) INJECTION PRN
Status: CANCELLED | OUTPATIENT
Start: 2023-09-16

## 2023-03-21 RX ORDER — METHYLPREDNISOLONE SODIUM SUCCINATE 125 MG/2ML
125 INJECTION, POWDER, LYOPHILIZED, FOR SOLUTION INTRAMUSCULAR; INTRAVENOUS PRN
Status: CANCELLED | OUTPATIENT
Start: 2023-09-16

## 2023-03-21 RX ORDER — ACETAMINOPHEN 325 MG/1
650 TABLET ORAL ONCE
Status: CANCELLED | OUTPATIENT
Start: 2023-09-16

## 2023-03-21 RX ORDER — METHYLPREDNISOLONE SODIUM SUCCINATE 125 MG/2ML
100 INJECTION, POWDER, LYOPHILIZED, FOR SOLUTION INTRAMUSCULAR; INTRAVENOUS ONCE
Status: CANCELLED | OUTPATIENT
Start: 2023-09-16

## 2023-03-21 RX ORDER — DIPHENHYDRAMINE HCL 25 MG
25 TABLET ORAL ONCE
Status: COMPLETED | OUTPATIENT
Start: 2023-03-21 | End: 2023-03-21

## 2023-03-21 RX ORDER — HEPARIN SODIUM (PORCINE) LOCK FLUSH IV SOLN 100 UNIT/ML 100 UNIT/ML
500 SOLUTION INTRAVENOUS PRN
Status: CANCELLED | OUTPATIENT
Start: 2023-09-16

## 2023-03-21 RX ADMIN — OCRELIZUMAB 600 MG: 300 INJECTION INTRAVENOUS at 11:19

## 2023-03-21 RX ADMIN — DIPHENHYDRAMINE HYDROCHLORIDE 25 MG: 25 TABLET ORAL at 11:04

## 2023-03-21 RX ADMIN — METHYLPREDNISOLONE SODIUM SUCCINATE 100 MG: 125 INJECTION, POWDER, FOR SOLUTION INTRAMUSCULAR; INTRAVENOUS at 11:05

## 2023-03-21 RX ADMIN — ACETAMINOPHEN 650 MG: 325 TABLET ORAL at 11:04

## 2023-03-21 ASSESSMENT — PAIN DESCRIPTION - PAIN TYPE: TYPE: CHRONIC PAIN

## 2023-03-21 ASSESSMENT — FIBROSIS 4 INDEX: FIB4 SCORE: 0.6

## 2023-03-21 NOTE — PROGRESS NOTES
[facility]. POC discussed, including estimated treatment time and hour of observation, pt agreeable. Pt denies any current s/s of infection or open wounds/sores. PIV started to right AC, brisk blood return observed. Pre-meds given as ordered. Ocrevus infused and titrated per pharmacy instructions to max rate of 300 ml/hr. Pt tolerated well with no s/s of adverse reaction. 1 hour obs completed with no s/s of adverse reaction. PIV flushed and removed, gauze and coban dressing placed. Schedulers emailed about future appointments. Pt discharged to home in good condition.

## 2023-05-09 ENCOUNTER — APPOINTMENT (OUTPATIENT)
Dept: OCCUPATIONAL MEDICINE | Facility: CLINIC | Age: 43
End: 2023-05-09

## 2023-05-09 ENCOUNTER — NON-PROVIDER VISIT (OUTPATIENT)
Dept: OCCUPATIONAL MEDICINE | Facility: CLINIC | Age: 43
End: 2023-05-09

## 2023-05-09 DIAGNOSIS — Z02.1 PRE-EMPLOYMENT DRUG SCREENING: ICD-10-CM

## 2023-05-09 PROCEDURE — 8907 PR URINE COLLECT ONLY: Performed by: PREVENTIVE MEDICINE

## 2023-07-17 DIAGNOSIS — G35 MULTIPLE SCLEROSIS (HCC): Primary | ICD-10-CM

## 2023-09-18 ENCOUNTER — OFFICE VISIT (OUTPATIENT)
Dept: NEUROLOGY | Facility: MEDICAL CENTER | Age: 43
End: 2023-09-18
Attending: PSYCHIATRY & NEUROLOGY
Payer: COMMERCIAL

## 2023-09-18 VITALS
OXYGEN SATURATION: 95 % | DIASTOLIC BLOOD PRESSURE: 78 MMHG | WEIGHT: 201.28 LBS | SYSTOLIC BLOOD PRESSURE: 110 MMHG | BODY MASS INDEX: 28.82 KG/M2 | HEIGHT: 70 IN | HEART RATE: 102 BPM | TEMPERATURE: 96.9 F

## 2023-09-18 DIAGNOSIS — G35 MULTIPLE SCLEROSIS (HCC): Primary | ICD-10-CM

## 2023-09-18 PROCEDURE — 99211 OFF/OP EST MAY X REQ PHY/QHP: CPT

## 2023-09-18 PROCEDURE — 3078F DIAST BP <80 MM HG: CPT | Performed by: PSYCHIATRY & NEUROLOGY

## 2023-09-18 PROCEDURE — 99215 OFFICE O/P EST HI 40 MIN: CPT | Performed by: PSYCHIATRY & NEUROLOGY

## 2023-09-18 PROCEDURE — 3074F SYST BP LT 130 MM HG: CPT | Performed by: PSYCHIATRY & NEUROLOGY

## 2023-09-18 ASSESSMENT — FIBROSIS 4 INDEX: FIB4 SCORE: 0.6

## 2023-09-18 NOTE — PROGRESS NOTES
"[facility]  MULTIPLE SCLEROSIS & NEUROIMMUNOLOGY  FOLLOW-UP VISIT    DISEASE SUMMARY:  Principal neurologic diagnosis: MS  Diagnosis of MS: 6/7/2021  Disease History:  - 5/16/2017: episode of numbness involving the right upper- and lower extremities  - 5/13/2021: episode of numbness involving the right face which progressed to include the right hand; right foot drop; slurred speech  - 6/7/2021: admitted at [facility]; workup included MRI CNS and LP; diagnosed w/ MS; treated with IVMP x3 days followed by oral steroids  - 9/21/2021, 10/5/2021: started Ocrevus  Disease course at onset: relapsing  Current disease course: relapsing  Previous disease therapies:  - none  Current disease therapies:  - ocrelizumab: most recent dose: 9/21/2022  Symptomatic therapies:  - none  CSF (6/10/2021):  - OCBs: positive (8, 10)  - RBCs: 4  - WBCs: 2  - protein: 47  - glucose: 96:  Other Testing:  - anti-AQP4 Ab (8/2/2021): <1.5  - anti-MOG Ab (8/2/2021): <1:10  MRI head:  - 7/5/2022: stable, no abnormal enhancement  - 6/7/2021: simultaneous presence of enhancing and non-enhancing lesions  MRI cervical spine:  - 7/5/2022: stable, typical-appearing lesion at ~C5, no abnormal enhancement  - 6/7/2021: no visible lesions  MRI thoracic spine:  - 6/7/2021: no visible lesions    CC: MS    INTERVAL HISTORY:  Dominick Cuadra is a 42 y.o. man with MS.  I last saw him in the MS Clinic on 1/18/2023.  At that time I recommended he continue ocrelizumab, and I ordered repeat imaging of the brain and cervical spine.  Today, he was unaccompanied, and he provided the following interval history:    Dominick continues on Ocrevus.  He tolerates the infusions well without side effects.  He has not noticed any new or worsened symptoms since last visit.  He thinks his PCP has ordered the recommended monitoring labs.    A review of MS-related symptoms was notable for the following:    Fatigue: yes, he is \"always tired,\" gets 6 hours of sleep/night  Weakness: " none  Numbness: none  Incoordination: none  Spasms/Spasticity: none  Vision Impairment: yes, he has to hold things farther from his eyes  Walking/Balance Problems: still mildly effected  Neuralgia: none  Bowel Symptoms: none  Bladder Symptoms: frequency: yes, but not a problem, hesitancy: some  Heat Sensitivity: he tries to avoid this  Depression: none  Cognitive/Memory Problems: none  Sexual Dysfunction: none  Anxiety: none    MEDICATIONS:  Current Outpatient Medications   Medication Sig    oxyCODONE-acetaminophen (PERCOCET) 5-325 MG Tab TAKE 1 TO 2 TABLETS BY MOUTH THREE TIMES DAILY FOR 7 DAYS AS NEEDED    sertraline (ZOLOFT) 50 MG Tab Take 50 mg by mouth every day.    benzonatate (TESSALON) 100 MG Cap TAKE 1 TO 2 CAPSULES BY MOUTH THREE TIMES DAILY AS NEEDED FOR COUGH (Patient not taking: Reported on 1/18/2023)    methocarbamol (ROBAXIN) 750 MG Tab Take 750 mg by mouth 3 times a day as needed. (Patient not taking: Reported on 1/18/2023)    vitamin D (VITAMIND D3) 1000 UNIT Tab Take 1 tablet by mouth every day. (Patient not taking: Reported on 9/18/2023)     MEDICAL, SOCIAL, AND FAMILY HISTORY:  There is no change in the patient's ROS or medical, social, or family histories since the previous visit on 1/18/2023.    REVIEW OF SYSTEMS:  A ROS was completed.  Pertinent positives and negatives were included in the HPI, above.  All other systems were reviewed and are negative.    PHYSICAL EXAM:  General/Medical:  - NAD    Neuro:  MENTAL STATUS: awake and alert; no deficits of speech or language; oriented to person, place, and time; affect was appropriate to situation; pleasant, cooperative    CRANIAL NERVES:    II: acuity: J1/J1, fields: intact to confrontation, pupils: NT, discs: sharp, no pallor    III/IV/VI: versions: grossly intact    V: facial sensation: intact    VII: facial expression: symmetric    VIII: hearing: intact to voice    IX/X: palate: elevates symmetrically    XI: shoulder shrug: symmetric    XII:  "tongue: midline    MOTOR:  - bulk: normal throughout  - tone: NT  Upper Extremity Strength  (R/L)    5/5   Elbow flexion 5/5   Elbow extension 5/5   Shoulder abduction 5/5     Lower Extremity Strength  (R/L)   Hip flexion 5/5   Knee extension 5/5   Knee flexion NT   Ankle plantarflexion 5/5   Ankle dorsiflexion 5/5     - pronator drift: NT  - abnormal movements: none    SENSATION:  - light touch: grossly intact throughout  - vibration (R/L, seconds): NT at the great toes  - pinprick: NT  - proprioception: NT  - Romberg: absent    COORDINATION:  - finger to nose: NT  - finger tapping: NT    REFLEXES:  Reflex Right Left   BR 2+ 2+   Biceps 2+ 2+   Triceps NT NT   Patellae 2+ 2+   Achilles NT NT   Toes NT NT     GAIT:  - narrow base  - toe-raised gait: intact  - heel-raised gait: intact  - tandem: intact    QUANTITATIVE SCORES:  Timed 25-foot walk (sec): 3.7 (3.4 on 1/18/2023, 3.5 on 5/12/2022, 3.9 on 7/29/2021).  Assistive device: none    REVIEW OF IMAGING STUDIES:  No additional imaging since the last visit.    REVIEW OF LABORATORY STUDIES:  Reviewed.    ASSESSMENT:  Dominick Cuadra is a 42 y.o. man with MS.  He remains clinically stable on Ocrevus, which he has tolerated well so far.  Plan to continue with this line of treatment.  Plan for repeat MRI brain w/o and w/ contrast.  I also recommended he get the influenza and Prenvar 20 vaccines.  I referred him to optometry.    PLAN:  Multiple Sclerosis:  - continue Ocrevus  - Prevnar 20  - influenza vaccine  - immunoglobulin levels (A, G, M)  - lymphocyte subsets    Follow-Up:  - Return in about 6 months (around 3/18/2024).     Signed: Bill Cordova M.D.    BILLING DOCUMENTATION:   I spent 48 minutes reviewing the medical record, interviewing and examining the patient, discussing my impression (see \"assessment\" above), and coordinating care.  "

## 2023-09-29 ENCOUNTER — OUTPATIENT INFUSION SERVICES (OUTPATIENT)
Dept: ONCOLOGY | Facility: MEDICAL CENTER | Age: 43
End: 2023-09-29
Attending: PSYCHIATRY & NEUROLOGY
Payer: COMMERCIAL

## 2023-09-29 VITALS
SYSTOLIC BLOOD PRESSURE: 118 MMHG | OXYGEN SATURATION: 94 % | BODY MASS INDEX: 28.34 KG/M2 | RESPIRATION RATE: 18 BRPM | TEMPERATURE: 97.3 F | WEIGHT: 197.97 LBS | HEART RATE: 107 BPM | DIASTOLIC BLOOD PRESSURE: 77 MMHG | HEIGHT: 70 IN

## 2023-09-29 DIAGNOSIS — G35 MULTIPLE SCLEROSIS (HCC): ICD-10-CM

## 2023-09-29 LAB
HBV CORE AB SERPL QL IA: NONREACTIVE
HBV SURFACE AG SER QL: NORMAL

## 2023-09-29 PROCEDURE — A9270 NON-COVERED ITEM OR SERVICE: HCPCS | Performed by: PSYCHIATRY & NEUROLOGY

## 2023-09-29 PROCEDURE — 700105 HCHG RX REV CODE 258: Performed by: PSYCHIATRY & NEUROLOGY

## 2023-09-29 PROCEDURE — 96415 CHEMO IV INFUSION ADDL HR: CPT

## 2023-09-29 PROCEDURE — 96413 CHEMO IV INFUSION 1 HR: CPT

## 2023-09-29 PROCEDURE — 86704 HEP B CORE ANTIBODY TOTAL: CPT

## 2023-09-29 PROCEDURE — 96375 TX/PRO/DX INJ NEW DRUG ADDON: CPT

## 2023-09-29 PROCEDURE — 700111 HCHG RX REV CODE 636 W/ 250 OVERRIDE (IP): Mod: JZ | Performed by: PSYCHIATRY & NEUROLOGY

## 2023-09-29 PROCEDURE — 700102 HCHG RX REV CODE 250 W/ 637 OVERRIDE(OP): Performed by: PSYCHIATRY & NEUROLOGY

## 2023-09-29 PROCEDURE — 87340 HEPATITIS B SURFACE AG IA: CPT

## 2023-09-29 RX ORDER — METHYLPREDNISOLONE SODIUM SUCCINATE 125 MG/2ML
125 INJECTION, POWDER, LYOPHILIZED, FOR SOLUTION INTRAMUSCULAR; INTRAVENOUS PRN
OUTPATIENT
Start: 2024-03-15

## 2023-09-29 RX ORDER — 0.9 % SODIUM CHLORIDE 0.9 %
10 VIAL (ML) INJECTION PRN
OUTPATIENT
Start: 2024-03-15

## 2023-09-29 RX ORDER — ACETAMINOPHEN 325 MG/1
650 TABLET ORAL ONCE
OUTPATIENT
Start: 2024-03-15

## 2023-09-29 RX ORDER — DIPHENHYDRAMINE HCL 25 MG
25 TABLET ORAL ONCE
OUTPATIENT
Start: 2024-03-15 | End: 2024-03-15

## 2023-09-29 RX ORDER — 0.9 % SODIUM CHLORIDE 0.9 %
VIAL (ML) INJECTION PRN
OUTPATIENT
Start: 2024-03-15

## 2023-09-29 RX ORDER — SODIUM CHLORIDE 9 MG/ML
INJECTION, SOLUTION INTRAVENOUS CONTINUOUS
OUTPATIENT
Start: 2024-03-15

## 2023-09-29 RX ORDER — HEPARIN SODIUM (PORCINE) LOCK FLUSH IV SOLN 100 UNIT/ML 100 UNIT/ML
500 SOLUTION INTRAVENOUS PRN
OUTPATIENT
Start: 2024-03-15

## 2023-09-29 RX ORDER — ACETAMINOPHEN 325 MG/1
650 TABLET ORAL ONCE
Status: COMPLETED | OUTPATIENT
Start: 2023-09-29 | End: 2023-09-29

## 2023-09-29 RX ORDER — METHYLPREDNISOLONE SODIUM SUCCINATE 125 MG/2ML
100 INJECTION, POWDER, LYOPHILIZED, FOR SOLUTION INTRAMUSCULAR; INTRAVENOUS ONCE
Status: COMPLETED | OUTPATIENT
Start: 2023-09-29 | End: 2023-09-29

## 2023-09-29 RX ORDER — EPINEPHRINE 1 MG/ML(1)
0.5 AMPUL (ML) INJECTION PRN
OUTPATIENT
Start: 2024-03-15

## 2023-09-29 RX ORDER — DIPHENHYDRAMINE HCL 25 MG
25 TABLET ORAL ONCE
Status: COMPLETED | OUTPATIENT
Start: 2023-09-29 | End: 2023-09-29

## 2023-09-29 RX ORDER — METHYLPREDNISOLONE SODIUM SUCCINATE 125 MG/2ML
100 INJECTION, POWDER, LYOPHILIZED, FOR SOLUTION INTRAMUSCULAR; INTRAVENOUS ONCE
OUTPATIENT
Start: 2024-03-15

## 2023-09-29 RX ORDER — ONDANSETRON 2 MG/ML
8 INJECTION INTRAMUSCULAR; INTRAVENOUS EVERY 4 HOURS PRN
OUTPATIENT
Start: 2024-03-15

## 2023-09-29 RX ORDER — 0.9 % SODIUM CHLORIDE 0.9 %
3 VIAL (ML) INJECTION PRN
OUTPATIENT
Start: 2024-03-15

## 2023-09-29 RX ORDER — DIPHENHYDRAMINE HYDROCHLORIDE 50 MG/ML
50 INJECTION INTRAMUSCULAR; INTRAVENOUS PRN
OUTPATIENT
Start: 2024-03-15

## 2023-09-29 RX ADMIN — DIPHENHYDRAMINE HYDROCHLORIDE 25 MG: 25 TABLET ORAL at 11:28

## 2023-09-29 RX ADMIN — OCRELIZUMAB 600 MG: 300 INJECTION INTRAVENOUS at 11:52

## 2023-09-29 RX ADMIN — METHYLPREDNISOLONE SODIUM SUCCINATE 100 MG: 125 INJECTION, POWDER, FOR SOLUTION INTRAMUSCULAR; INTRAVENOUS at 11:29

## 2023-09-29 RX ADMIN — ACETAMINOPHEN 650 MG: 325 TABLET ORAL at 11:27

## 2023-09-29 ASSESSMENT — FIBROSIS 4 INDEX: FIB4 SCORE: 0.6

## 2023-09-29 NOTE — PROGRESS NOTES
Dominick to infusion for Q6 month Ocrevus infusion. Reports feeling well today with no complaints. Denies recent illness, infection, open wounds or vaccination. Has tolerated previous infusions well with no adverse effects. PIV started with positive blood return and hep B labs drawn. Pre-treatment meds given as ordered. Ocrevus infused with filter at subsequent rate, titrated per MAR to infuse over 2 hours and patient observed 1 hour post infusion, patient tolerated well with no adverse effects. PIV flushed post infusion with positive blood return, d/amanda with tip intact. Patient left in stable condition, knows to check My Chart for next appt.

## 2023-12-21 NOTE — PROGRESS NOTES
Called neuro doctor give a update patient wanting to leave ama if there is not a private room states will ask Np to come see the patient charge Rn updated.   Alert

## 2024-03-25 ENCOUNTER — APPOINTMENT (OUTPATIENT)
Dept: NEUROLOGY | Facility: MEDICAL CENTER | Age: 44
End: 2024-03-25
Attending: PSYCHIATRY & NEUROLOGY
Payer: COMMERCIAL

## 2024-03-29 ENCOUNTER — OUTPATIENT INFUSION SERVICES (OUTPATIENT)
Dept: ONCOLOGY | Facility: MEDICAL CENTER | Age: 44
End: 2024-03-29
Attending: PSYCHIATRY & NEUROLOGY
Payer: COMMERCIAL

## 2024-03-29 VITALS
TEMPERATURE: 98.6 F | WEIGHT: 190.04 LBS | SYSTOLIC BLOOD PRESSURE: 135 MMHG | DIASTOLIC BLOOD PRESSURE: 85 MMHG | HEIGHT: 71 IN | RESPIRATION RATE: 18 BRPM | OXYGEN SATURATION: 97 % | HEART RATE: 94 BPM | BODY MASS INDEX: 26.6 KG/M2

## 2024-03-29 DIAGNOSIS — G35 MULTIPLE SCLEROSIS (HCC): ICD-10-CM

## 2024-03-29 PROCEDURE — 96365 THER/PROPH/DIAG IV INF INIT: CPT

## 2024-03-29 PROCEDURE — 96375 TX/PRO/DX INJ NEW DRUG ADDON: CPT

## 2024-03-29 PROCEDURE — 700105 HCHG RX REV CODE 258: Performed by: PSYCHIATRY & NEUROLOGY

## 2024-03-29 PROCEDURE — 96413 CHEMO IV INFUSION 1 HR: CPT

## 2024-03-29 PROCEDURE — 700102 HCHG RX REV CODE 250 W/ 637 OVERRIDE(OP): Performed by: PSYCHIATRY & NEUROLOGY

## 2024-03-29 PROCEDURE — 96366 THER/PROPH/DIAG IV INF ADDON: CPT

## 2024-03-29 PROCEDURE — 700111 HCHG RX REV CODE 636 W/ 250 OVERRIDE (IP): Mod: JZ | Performed by: PSYCHIATRY & NEUROLOGY

## 2024-03-29 PROCEDURE — 96415 CHEMO IV INFUSION ADDL HR: CPT

## 2024-03-29 PROCEDURE — A9270 NON-COVERED ITEM OR SERVICE: HCPCS | Performed by: PSYCHIATRY & NEUROLOGY

## 2024-03-29 RX ORDER — 0.9 % SODIUM CHLORIDE 0.9 %
3 VIAL (ML) INJECTION PRN
OUTPATIENT
Start: 2024-09-23

## 2024-03-29 RX ORDER — ACETAMINOPHEN 325 MG/1
650 TABLET ORAL ONCE
OUTPATIENT
Start: 2024-09-23

## 2024-03-29 RX ORDER — ONDANSETRON 2 MG/ML
8 INJECTION INTRAMUSCULAR; INTRAVENOUS EVERY 4 HOURS PRN
OUTPATIENT
Start: 2024-09-23

## 2024-03-29 RX ORDER — EPINEPHRINE 1 MG/ML(1)
0.5 AMPUL (ML) INJECTION PRN
OUTPATIENT
Start: 2024-09-23

## 2024-03-29 RX ORDER — METHYLPREDNISOLONE SODIUM SUCCINATE 125 MG/2ML
100 INJECTION, POWDER, LYOPHILIZED, FOR SOLUTION INTRAMUSCULAR; INTRAVENOUS ONCE
Status: COMPLETED | OUTPATIENT
Start: 2024-03-29 | End: 2024-03-29

## 2024-03-29 RX ORDER — DIPHENHYDRAMINE HCL 25 MG
25 TABLET ORAL ONCE
Status: COMPLETED | OUTPATIENT
Start: 2024-03-29 | End: 2024-03-29

## 2024-03-29 RX ORDER — ACETAMINOPHEN 325 MG/1
650 TABLET ORAL ONCE
Status: COMPLETED | OUTPATIENT
Start: 2024-03-29 | End: 2024-03-29

## 2024-03-29 RX ORDER — DIPHENHYDRAMINE HYDROCHLORIDE 50 MG/ML
50 INJECTION INTRAMUSCULAR; INTRAVENOUS PRN
OUTPATIENT
Start: 2024-09-23

## 2024-03-29 RX ORDER — 0.9 % SODIUM CHLORIDE 0.9 %
VIAL (ML) INJECTION PRN
OUTPATIENT
Start: 2024-09-23

## 2024-03-29 RX ORDER — METHYLPREDNISOLONE SODIUM SUCCINATE 125 MG/2ML
100 INJECTION, POWDER, LYOPHILIZED, FOR SOLUTION INTRAMUSCULAR; INTRAVENOUS ONCE
OUTPATIENT
Start: 2024-09-23

## 2024-03-29 RX ORDER — 0.9 % SODIUM CHLORIDE 0.9 %
10 VIAL (ML) INJECTION PRN
OUTPATIENT
Start: 2024-09-23

## 2024-03-29 RX ORDER — SODIUM CHLORIDE 9 MG/ML
INJECTION, SOLUTION INTRAVENOUS CONTINUOUS
OUTPATIENT
Start: 2024-09-23

## 2024-03-29 RX ORDER — METHYLPREDNISOLONE SODIUM SUCCINATE 125 MG/2ML
125 INJECTION, POWDER, LYOPHILIZED, FOR SOLUTION INTRAMUSCULAR; INTRAVENOUS PRN
OUTPATIENT
Start: 2024-09-23

## 2024-03-29 RX ORDER — HEPARIN SODIUM (PORCINE) LOCK FLUSH IV SOLN 100 UNIT/ML 100 UNIT/ML
500 SOLUTION INTRAVENOUS PRN
OUTPATIENT
Start: 2024-09-23

## 2024-03-29 RX ORDER — DIPHENHYDRAMINE HCL 25 MG
25 TABLET ORAL ONCE
OUTPATIENT
Start: 2024-09-23 | End: 2024-09-23

## 2024-03-29 RX ADMIN — DIPHENHYDRAMINE HYDROCHLORIDE 25 MG: 25 TABLET ORAL at 11:29

## 2024-03-29 RX ADMIN — OCRELIZUMAB 600 MG: 300 INJECTION INTRAVENOUS at 11:49

## 2024-03-29 RX ADMIN — ACETAMINOPHEN 650 MG: 325 TABLET ORAL at 11:29

## 2024-03-29 RX ADMIN — METHYLPREDNISOLONE SODIUM SUCCINATE 100 MG: 125 INJECTION, POWDER, FOR SOLUTION INTRAMUSCULAR; INTRAVENOUS at 11:30

## 2024-03-29 ASSESSMENT — FIBROSIS 4 INDEX: FIB4 SCORE: 0.61

## 2024-03-29 NOTE — PROGRESS NOTES
Patient finished up Ocrevus and 1 hour obs. No s/s of infusion reaction. PIV flushed with + blood return and removed. Gauze and coban applied as a dressing. Patient to home.

## 2024-09-12 ENCOUNTER — APPOINTMENT (OUTPATIENT)
Dept: NEUROLOGY | Facility: MEDICAL CENTER | Age: 44
End: 2024-09-12
Attending: PSYCHIATRY & NEUROLOGY
Payer: COMMERCIAL

## 2024-09-13 ENCOUNTER — TELEPHONE (OUTPATIENT)
Dept: HEALTH INFORMATION MANAGEMENT | Facility: OTHER | Age: 44
End: 2024-09-13
Payer: COMMERCIAL

## 2024-10-24 ENCOUNTER — OUTPATIENT INFUSION SERVICES (OUTPATIENT)
Dept: ONCOLOGY | Facility: MEDICAL CENTER | Age: 44
End: 2024-10-24
Attending: PSYCHIATRY & NEUROLOGY
Payer: COMMERCIAL

## 2024-10-24 VITALS
WEIGHT: 178.57 LBS | TEMPERATURE: 98 F | HEIGHT: 70 IN | BODY MASS INDEX: 25.56 KG/M2 | HEART RATE: 94 BPM | SYSTOLIC BLOOD PRESSURE: 117 MMHG | RESPIRATION RATE: 18 BRPM | OXYGEN SATURATION: 97 % | DIASTOLIC BLOOD PRESSURE: 77 MMHG

## 2024-10-24 DIAGNOSIS — G35 MULTIPLE SCLEROSIS (HCC): ICD-10-CM

## 2024-10-24 PROCEDURE — 700102 HCHG RX REV CODE 250 W/ 637 OVERRIDE(OP): Performed by: PSYCHIATRY & NEUROLOGY

## 2024-10-24 PROCEDURE — 96375 TX/PRO/DX INJ NEW DRUG ADDON: CPT

## 2024-10-24 PROCEDURE — 700111 HCHG RX REV CODE 636 W/ 250 OVERRIDE (IP): Mod: JZ | Performed by: PSYCHIATRY & NEUROLOGY

## 2024-10-24 PROCEDURE — 96413 CHEMO IV INFUSION 1 HR: CPT

## 2024-10-24 PROCEDURE — 700105 HCHG RX REV CODE 258: Performed by: PSYCHIATRY & NEUROLOGY

## 2024-10-24 PROCEDURE — A9270 NON-COVERED ITEM OR SERVICE: HCPCS | Performed by: PSYCHIATRY & NEUROLOGY

## 2024-10-24 PROCEDURE — 96415 CHEMO IV INFUSION ADDL HR: CPT

## 2024-10-24 RX ORDER — 0.9 % SODIUM CHLORIDE 0.9 %
10 VIAL (ML) INJECTION PRN
OUTPATIENT
Start: 2025-03-24

## 2024-10-24 RX ORDER — ACETAMINOPHEN 325 MG/1
650 TABLET ORAL ONCE
Status: COMPLETED | OUTPATIENT
Start: 2024-10-24 | End: 2024-10-24

## 2024-10-24 RX ORDER — METHYLPREDNISOLONE SODIUM SUCCINATE 125 MG/2ML
125 INJECTION, POWDER, LYOPHILIZED, FOR SOLUTION INTRAMUSCULAR; INTRAVENOUS PRN
OUTPATIENT
Start: 2025-03-24

## 2024-10-24 RX ORDER — 0.9 % SODIUM CHLORIDE 0.9 %
VIAL (ML) INJECTION PRN
OUTPATIENT
Start: 2025-03-24

## 2024-10-24 RX ORDER — DIPHENHYDRAMINE HYDROCHLORIDE 50 MG/ML
50 INJECTION INTRAMUSCULAR; INTRAVENOUS PRN
OUTPATIENT
Start: 2025-03-24

## 2024-10-24 RX ORDER — ONDANSETRON 2 MG/ML
8 INJECTION INTRAMUSCULAR; INTRAVENOUS EVERY 4 HOURS PRN
OUTPATIENT
Start: 2025-03-24

## 2024-10-24 RX ORDER — 0.9 % SODIUM CHLORIDE 0.9 %
3 VIAL (ML) INJECTION PRN
OUTPATIENT
Start: 2025-03-24

## 2024-10-24 RX ORDER — SODIUM CHLORIDE 9 MG/ML
INJECTION, SOLUTION INTRAVENOUS CONTINUOUS
OUTPATIENT
Start: 2025-03-24

## 2024-10-24 RX ORDER — DIPHENHYDRAMINE HCL 25 MG
25 TABLET ORAL ONCE
Status: COMPLETED | OUTPATIENT
Start: 2024-10-24 | End: 2024-10-24

## 2024-10-24 RX ORDER — HEPARIN SODIUM (PORCINE) LOCK FLUSH IV SOLN 100 UNIT/ML 100 UNIT/ML
500 SOLUTION INTRAVENOUS PRN
OUTPATIENT
Start: 2025-03-24

## 2024-10-24 RX ORDER — DIPHENHYDRAMINE HCL 25 MG
25 TABLET ORAL ONCE
OUTPATIENT
Start: 2025-03-24 | End: 2025-03-24

## 2024-10-24 RX ORDER — METHYLPREDNISOLONE SODIUM SUCCINATE 125 MG/2ML
100 INJECTION, POWDER, LYOPHILIZED, FOR SOLUTION INTRAMUSCULAR; INTRAVENOUS ONCE
Status: COMPLETED | OUTPATIENT
Start: 2024-10-24 | End: 2024-10-24

## 2024-10-24 RX ORDER — METHYLPREDNISOLONE SODIUM SUCCINATE 125 MG/2ML
100 INJECTION, POWDER, LYOPHILIZED, FOR SOLUTION INTRAMUSCULAR; INTRAVENOUS ONCE
OUTPATIENT
Start: 2025-03-24

## 2024-10-24 RX ORDER — ACETAMINOPHEN 325 MG/1
650 TABLET ORAL ONCE
OUTPATIENT
Start: 2025-03-24

## 2024-10-24 RX ORDER — EPINEPHRINE 1 MG/ML(1)
0.5 AMPUL (ML) INJECTION PRN
OUTPATIENT
Start: 2025-03-24

## 2024-10-24 RX ADMIN — DIPHENHYDRAMINE HYDROCHLORIDE 25 MG: 25 TABLET ORAL at 08:16

## 2024-10-24 RX ADMIN — OCRELIZUMAB 600 MG: 300 INJECTION INTRAVENOUS at 08:34

## 2024-10-24 RX ADMIN — METHYLPREDNISOLONE SODIUM SUCCINATE 100 MG: 125 INJECTION, POWDER, FOR SOLUTION INTRAMUSCULAR; INTRAVENOUS at 08:16

## 2024-10-24 RX ADMIN — ACETAMINOPHEN 650 MG: 325 TABLET ORAL at 08:16

## 2025-03-13 ENCOUNTER — HOSPITAL ENCOUNTER (OUTPATIENT)
Dept: LAB | Facility: MEDICAL CENTER | Age: 45
End: 2025-03-13
Attending: FAMILY MEDICINE
Payer: COMMERCIAL

## 2025-03-13 LAB
BASOPHILS # BLD AUTO: 0.5 % (ref 0–1.8)
BASOPHILS # BLD: 0.05 K/UL (ref 0–0.12)
EOSINOPHIL # BLD AUTO: 0.22 K/UL (ref 0–0.51)
EOSINOPHIL NFR BLD: 2.4 % (ref 0–6.9)
ERYTHROCYTE [DISTWIDTH] IN BLOOD BY AUTOMATED COUNT: 39.8 FL (ref 35.9–50)
EST. AVERAGE GLUCOSE BLD GHB EST-MCNC: 111 MG/DL
HBA1C MFR BLD: 5.5 % (ref 4–5.6)
HCT VFR BLD AUTO: 55 % (ref 42–52)
HGB BLD-MCNC: 18.1 G/DL (ref 14–18)
IMM GRANULOCYTES # BLD AUTO: 0.05 K/UL (ref 0–0.11)
IMM GRANULOCYTES NFR BLD AUTO: 0.5 % (ref 0–0.9)
LYMPHOCYTES # BLD AUTO: 2.57 K/UL (ref 1–4.8)
LYMPHOCYTES NFR BLD: 27.7 % (ref 22–41)
MCH RBC QN AUTO: 28.7 PG (ref 27–33)
MCHC RBC AUTO-ENTMCNC: 32.9 G/DL (ref 32.3–36.5)
MCV RBC AUTO: 87.3 FL (ref 81.4–97.8)
MONOCYTES # BLD AUTO: 0.76 K/UL (ref 0–0.85)
MONOCYTES NFR BLD AUTO: 8.2 % (ref 0–13.4)
NEUTROPHILS # BLD AUTO: 5.62 K/UL (ref 1.82–7.42)
NEUTROPHILS NFR BLD: 60.7 % (ref 44–72)
NRBC # BLD AUTO: 0 K/UL
NRBC BLD-RTO: 0 /100 WBC (ref 0–0.2)
PLATELET # BLD AUTO: 368 K/UL (ref 164–446)
PMV BLD AUTO: 9.4 FL (ref 9–12.9)
RBC # BLD AUTO: 6.3 M/UL (ref 4.7–6.1)
WBC # BLD AUTO: 9.3 K/UL (ref 4.8–10.8)

## 2025-03-13 PROCEDURE — 84153 ASSAY OF PSA TOTAL: CPT

## 2025-03-13 PROCEDURE — 82670 ASSAY OF TOTAL ESTRADIOL: CPT

## 2025-03-13 PROCEDURE — 80053 COMPREHEN METABOLIC PANEL: CPT

## 2025-03-13 PROCEDURE — 83036 HEMOGLOBIN GLYCOSYLATED A1C: CPT

## 2025-03-13 PROCEDURE — 84443 ASSAY THYROID STIM HORMONE: CPT

## 2025-03-13 PROCEDURE — 84402 ASSAY OF FREE TESTOSTERONE: CPT

## 2025-03-13 PROCEDURE — 85025 COMPLETE CBC W/AUTO DIFF WBC: CPT

## 2025-03-13 PROCEDURE — 80061 LIPID PANEL: CPT

## 2025-03-13 PROCEDURE — 84481 FREE ASSAY (FT-3): CPT

## 2025-03-13 PROCEDURE — 84403 ASSAY OF TOTAL TESTOSTERONE: CPT

## 2025-03-13 PROCEDURE — 86141 C-REACTIVE PROTEIN HS: CPT

## 2025-03-13 PROCEDURE — 84270 ASSAY OF SEX HORMONE GLOBUL: CPT

## 2025-03-13 PROCEDURE — 36415 COLL VENOUS BLD VENIPUNCTURE: CPT

## 2025-03-14 LAB
ALBUMIN SERPL BCP-MCNC: 4.6 G/DL (ref 3.2–4.9)
ALBUMIN/GLOB SERPL: 1.9 G/DL
ALP SERPL-CCNC: 120 U/L (ref 30–99)
ALT SERPL-CCNC: 22 U/L (ref 2–50)
ANION GAP SERPL CALC-SCNC: 13 MMOL/L (ref 7–16)
AST SERPL-CCNC: 28 U/L (ref 12–45)
BILIRUB SERPL-MCNC: 0.6 MG/DL (ref 0.1–1.5)
BUN SERPL-MCNC: 11 MG/DL (ref 8–22)
CALCIUM ALBUM COR SERPL-MCNC: 8.9 MG/DL (ref 8.5–10.5)
CALCIUM SERPL-MCNC: 9.4 MG/DL (ref 8.5–10.5)
CHLORIDE SERPL-SCNC: 107 MMOL/L (ref 96–112)
CHOLEST SERPL-MCNC: 137 MG/DL (ref 100–199)
CO2 SERPL-SCNC: 23 MMOL/L (ref 20–33)
CREAT SERPL-MCNC: 1.03 MG/DL (ref 0.5–1.4)
CRP SERPL HS-MCNC: 1.5 MG/L (ref 0–3)
ESTRADIOL SERPL-MCNC: 64.7 PG/ML
GFR SERPLBLD CREATININE-BSD FMLA CKD-EPI: 90 ML/MIN/1.73 M 2
GLOBULIN SER CALC-MCNC: 2.4 G/DL (ref 1.9–3.5)
GLUCOSE SERPL-MCNC: 100 MG/DL (ref 65–99)
HDLC SERPL-MCNC: 43 MG/DL
LDLC SERPL CALC-MCNC: 83 MG/DL
POTASSIUM SERPL-SCNC: 4.4 MMOL/L (ref 3.6–5.5)
PROT SERPL-MCNC: 7 G/DL (ref 6–8.2)
PSA SERPL DL<=0.01 NG/ML-MCNC: 3.18 NG/ML (ref 0–4)
SODIUM SERPL-SCNC: 143 MMOL/L (ref 135–145)
T3FREE SERPL-MCNC: 3.42 PG/ML (ref 2–4.4)
TESTOST SERPL-MCNC: >1500 NG/DL (ref 175–781)
TRIGL SERPL-MCNC: 53 MG/DL (ref 0–149)
TSH SERPL-ACNC: 1.33 UIU/ML (ref 0.35–5.5)

## 2025-03-15 LAB
SHBG SERPL-SCNC: 24 NMOL/L (ref 17–56)
TESTOST FREE MFR SERPL: ABNORMAL % (ref 1.6–2.9)
TESTOST FREE SERPL-MCNC: ABNORMAL PG/ML (ref 47–244)
TESTOST SERPL-MCNC: >1500 NG/DL (ref 300–890)

## 2025-05-08 ENCOUNTER — OUTPATIENT INFUSION SERVICES (OUTPATIENT)
Dept: ONCOLOGY | Facility: MEDICAL CENTER | Age: 45
End: 2025-05-08
Attending: PSYCHIATRY & NEUROLOGY
Payer: COMMERCIAL

## 2025-05-08 VITALS
OXYGEN SATURATION: 94 % | RESPIRATION RATE: 18 BRPM | BODY MASS INDEX: 27.14 KG/M2 | WEIGHT: 189.6 LBS | SYSTOLIC BLOOD PRESSURE: 124 MMHG | HEIGHT: 70 IN | HEART RATE: 97 BPM | TEMPERATURE: 98.5 F | DIASTOLIC BLOOD PRESSURE: 85 MMHG

## 2025-05-08 DIAGNOSIS — G35 MULTIPLE SCLEROSIS (HCC): ICD-10-CM

## 2025-05-08 PROCEDURE — 96413 CHEMO IV INFUSION 1 HR: CPT

## 2025-05-08 PROCEDURE — 700111 HCHG RX REV CODE 636 W/ 250 OVERRIDE (IP): Mod: JZ | Performed by: PSYCHIATRY & NEUROLOGY

## 2025-05-08 PROCEDURE — A9270 NON-COVERED ITEM OR SERVICE: HCPCS | Performed by: PSYCHIATRY & NEUROLOGY

## 2025-05-08 PROCEDURE — 700105 HCHG RX REV CODE 258: Performed by: PSYCHIATRY & NEUROLOGY

## 2025-05-08 PROCEDURE — 700102 HCHG RX REV CODE 250 W/ 637 OVERRIDE(OP): Performed by: PSYCHIATRY & NEUROLOGY

## 2025-05-08 PROCEDURE — 96375 TX/PRO/DX INJ NEW DRUG ADDON: CPT

## 2025-05-08 PROCEDURE — 96415 CHEMO IV INFUSION ADDL HR: CPT

## 2025-05-08 RX ORDER — DIPHENHYDRAMINE HYDROCHLORIDE 50 MG/ML
50 INJECTION, SOLUTION INTRAMUSCULAR; INTRAVENOUS PRN
OUTPATIENT
Start: 2025-10-19

## 2025-05-08 RX ORDER — ONDANSETRON 2 MG/ML
8 INJECTION INTRAMUSCULAR; INTRAVENOUS EVERY 4 HOURS PRN
OUTPATIENT
Start: 2025-10-19

## 2025-05-08 RX ORDER — 0.9 % SODIUM CHLORIDE 0.9 %
10 VIAL (ML) INJECTION PRN
OUTPATIENT
Start: 2025-10-19

## 2025-05-08 RX ORDER — ACETAMINOPHEN 325 MG/1
650 TABLET ORAL ONCE
Status: COMPLETED | OUTPATIENT
Start: 2025-05-08 | End: 2025-05-08

## 2025-05-08 RX ORDER — METHYLPREDNISOLONE SODIUM SUCCINATE 125 MG/2ML
100 INJECTION, POWDER, LYOPHILIZED, FOR SOLUTION INTRAMUSCULAR; INTRAVENOUS ONCE
Status: COMPLETED | OUTPATIENT
Start: 2025-05-08 | End: 2025-05-08

## 2025-05-08 RX ORDER — METHYLPREDNISOLONE SODIUM SUCCINATE 125 MG/2ML
125 INJECTION INTRAMUSCULAR; INTRAVENOUS PRN
OUTPATIENT
Start: 2025-10-19

## 2025-05-08 RX ORDER — DIPHENHYDRAMINE HCL 25 MG
25 TABLET ORAL ONCE
OUTPATIENT
Start: 2025-10-19 | End: 2025-10-19

## 2025-05-08 RX ORDER — 0.9 % SODIUM CHLORIDE 0.9 %
VIAL (ML) INJECTION PRN
OUTPATIENT
Start: 2025-10-19

## 2025-05-08 RX ORDER — HEPARIN SODIUM (PORCINE) LOCK FLUSH IV SOLN 100 UNIT/ML 100 UNIT/ML
500 SOLUTION INTRAVENOUS PRN
OUTPATIENT
Start: 2025-10-19

## 2025-05-08 RX ORDER — SODIUM CHLORIDE 9 MG/ML
INJECTION, SOLUTION INTRAVENOUS CONTINUOUS
Status: DISCONTINUED | OUTPATIENT
Start: 2025-05-08 | End: 2025-05-08 | Stop reason: HOSPADM

## 2025-05-08 RX ORDER — ACETAMINOPHEN 325 MG/1
650 TABLET ORAL ONCE
OUTPATIENT
Start: 2025-10-19

## 2025-05-08 RX ORDER — METHYLPREDNISOLONE SODIUM SUCCINATE 125 MG/2ML
100 INJECTION, POWDER, LYOPHILIZED, FOR SOLUTION INTRAMUSCULAR; INTRAVENOUS ONCE
OUTPATIENT
Start: 2025-10-19

## 2025-05-08 RX ORDER — 0.9 % SODIUM CHLORIDE 0.9 %
3 VIAL (ML) INJECTION PRN
OUTPATIENT
Start: 2025-10-19

## 2025-05-08 RX ORDER — EPINEPHRINE 1 MG/ML(1)
0.5 AMPUL (ML) INJECTION PRN
OUTPATIENT
Start: 2025-10-19

## 2025-05-08 RX ORDER — SODIUM CHLORIDE 9 MG/ML
INJECTION, SOLUTION INTRAVENOUS CONTINUOUS
OUTPATIENT
Start: 2025-10-19

## 2025-05-08 RX ORDER — DIPHENHYDRAMINE HCL 25 MG
25 TABLET ORAL ONCE
Status: COMPLETED | OUTPATIENT
Start: 2025-05-08 | End: 2025-05-08

## 2025-05-08 RX ADMIN — ACETAMINOPHEN 650 MG: 325 TABLET ORAL at 10:36

## 2025-05-08 RX ADMIN — OCRELIZUMAB 600 MG: 300 INJECTION INTRAVENOUS at 10:49

## 2025-05-08 RX ADMIN — METHYLPREDNISOLONE SODIUM SUCCINATE 100 MG: 125 INJECTION INTRAMUSCULAR; INTRAVENOUS at 10:37

## 2025-05-08 RX ADMIN — SODIUM CHLORIDE: 9 INJECTION, SOLUTION INTRAVENOUS at 10:36

## 2025-05-08 RX ADMIN — DIPHENHYDRAMINE HYDROCHLORIDE 25 MG: 25 TABLET ORAL at 10:36

## 2025-05-08 ASSESSMENT — FIBROSIS 4 INDEX: FIB4 SCORE: 0.75

## 2025-05-08 NOTE — PROGRESS NOTES
Pt arrived ambulatory to [facility] for 6 month Ocrevus infusion for MS. POC discussed with pt and he agrees with plan.     PIV established, brisk blood return noted. Pt medicated per MAR. Ocrevus titrated per subsequent rate. Pt monitord x 1 hour post Ocrevus infusion. Pt tolerated treatment without s/s adverse reaction. PIV dc'd catheter tip intact, gauze and coban dressing applied.     Pt discharged to self care, NAD. Scheduling emailed for next 6 month appointment.